# Patient Record
Sex: MALE | Race: BLACK OR AFRICAN AMERICAN | Employment: STUDENT | URBAN - METROPOLITAN AREA
[De-identification: names, ages, dates, MRNs, and addresses within clinical notes are randomized per-mention and may not be internally consistent; named-entity substitution may affect disease eponyms.]

---
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
## 2021-01-05 ENCOUNTER — HOSPITAL ENCOUNTER (OUTPATIENT)
Dept: PHYSICAL THERAPY | Age: 21
Discharge: HOME | End: 2021-01-05
Payer: MEDICAID

## 2021-01-05 PROCEDURE — 97016 VASOPNEUMATIC DEVICE THERAPY: CPT | Performed by: PHYSICAL THERAPIST

## 2021-01-05 PROCEDURE — 97110 THERAPEUTIC EXERCISES: CPT | Performed by: PHYSICAL THERAPIST

## 2021-01-05 NOTE — PROGRESS NOTES
PT DAILY TREATMENT NOTE - [facility] -15    Patient Name: Brielle Gibbs  Date:2021  : 2000  [x]  Patient  Verified  Payor: BLUE CROSS MEDICAID / Plan: [facility] [employer]KEEPERS [facility] / Product Type: Managed Care Medicaid /    In time: 115p  Out time:215p  Total Treatment Time (min): 60  Total Timed Codes (min): 45  1:1 Treatment Time ( [address] only): 39   Visit #:  4    Treatment Area: Right ankle pain [M25.571]    SUBJECTIVE  Pain Level (0-10 scale): 1  Any medication changes, allergies to medications, adverse drug reactions, diagnosis change, or new procedure performed?: [x] No    [] Yes (see summary sheet for update)  Subjective functional status/changes:   [] No changes reported  Ankle is feeling a little better and he is getting around on it more. OBJECTIVE    Modality rationale: decrease edema, decrease inflammation and decrease pain to improve the patients ability to run without pain    Min Type Additional Details        []? Estim: []? Att   []? Unatt    []? TENS instruct                  []?IFC  []? Premod   []? NMES                     []?Other:  []?w/US   []?w/ice   []?w/heat  Position:  Location:        []? Traction: []? Cervical       []? Lumbar                       []? Prone          []? Supine                       []?Intermittent   []? Continuous Lbs:  []? before manual  []? after manual  []?w/heat     []? Ultrasound: []? Continuous   []? Pulsed                       at: []?1MHz   []? 3MHz Location:  W/cm2:     []? Paraffin         Location:   []?w/heat     []? Ice     []? Heat  []? Ice massage Position:  Location:     []? Laser  []? Other: Position:  Location:   15    [x]? Vasopneumatic Device Pressure:       [x]? lo []? med []? hi   Temperature: 34deg       [x]? Skin assessment post-treatment:  [x]? intact []? redness- no adverse reaction    []? redness  adverse reaction:      45 min Therapeutic Exercise:  [x]?  See flow sheet : ankle DF PROM   Rationale: increase ROM, increase strength and improve coordination to improve the patients ability to run without pain     With   []? TE   []? TA   []? neuro   []? other: Patient Education: [x]? Review HEP    []? Progressed/Changed HEP based on:   []? positioning   []? body mechanics   []? transfers   []? heat/ice application    []? other:       Other Objective/Functional Measures: ankle DF: 5deg        Pain Level (0-10 scale) post treatment: 1     ASSESSMENT/Changes in Function:   Added some resisted ankle 4 way today; only reporting some lateral pain with eversion. Did report some medial ankle pain with TG squats that alleviated after [employer]. Patient will continue to benefit from skilled PT services to modify and progress therapeutic interventions, address functional mobility deficits, address ROM deficits, address strength deficits, analyze and address soft tissue restrictions, analyze and cue movement patterns and analyze and modify body mechanics/ergonomics to attain remaining goals. []? See Plan of Care  []? See progress note/recertification  []? See Discharge Summary         Progress towards goals / Updated goals:  Short Term Goals: To be accomplished in 2-4 treatments:               1. Pt will be compliant with initial HEP and PT attendance PROGRESSING               2. Pt will be able to achieve 0deg ankle DF without significant pain  Long Term Goals: To be accomplished in 10-12 treatments:               1. Pt will be able to return to running track with minimal discomfort               2. Patient will be able to ambulate all distances without increase in pain               3. Pt will be able to perform 10 single limb heel raises without increase in pain               4. Pt will be able to self-manage care using updated HEP for improved independence               5. Improved FOTO score to 73 or better to improve function     PLAN  [x]? Upgrade activities as tolerated     [x]? Continue plan of care  []?   Update interventions per flow sheet       []? Discharge due to:_  []?   Other:_        Abena Donohue, PT, DPT 1/5/2021

## 2021-01-08 ENCOUNTER — HOSPITAL ENCOUNTER (OUTPATIENT)
Dept: PHYSICAL THERAPY | Age: 21
Discharge: HOME | End: 2021-01-08
Payer: MEDICAID

## 2021-01-08 PROCEDURE — 97110 THERAPEUTIC EXERCISES: CPT | Performed by: PHYSICAL THERAPIST

## 2021-01-08 PROCEDURE — 97016 VASOPNEUMATIC DEVICE THERAPY: CPT | Performed by: PHYSICAL THERAPIST

## 2021-01-08 NOTE — PROGRESS NOTES
PT DAILY TREATMENT NOTE - [facility] 2-15    Patient Name: Stephane Smith  Date:2021  : 2000  [x]  Patient  Verified  Payor: BLUE CROSS MEDICAID / Plan: [facility] HEALTHKEEPERS PLUS / Product Type: Managed Care Medicaid /    In time: 6955V  Out time: 1205p  Total Treatment Time (min): 70  Total Timed Codes (min): 55  1:1 Treatment Time ([facility] only): 54   Visit #: 5    Treatment Area: Right ankle pain [M25.571]    SUBJECTIVE  Pain Level (0-10 scale): 0  Any medication changes, allergies to medications, adverse drug reactions, diagnosis change, or new procedure performed?: [x] No    [] Yes (see summary sheet for update)  Subjective functional status/changes:   [] No changes reported  Ankle is sore from stretching and doing the exercises, but not painful. OBJECTIVE    Modality rationale: decrease edema, decrease inflammation and decrease pain to improve the patients ability to run without pain     Min Type Additional Details        []? ? Estim: []??Att   []? ? Unatt    []? ?TENS instruct                  []? ?IFC  []? ?Premod   []? ?NMES                     []? ? Other:  []??w/US   []? ?w/ice   []? ?w/heat  Position:  Location:        []? ?  Traction: []?? Cervical       []? ?Lumbar                       []? ? Prone          []? ?Supine                       []? ?Intermittent   []? ? Continuous Lbs:  []?? before manual  []? ? after manual  []? ?w/heat     []? ?  Ultrasound: []??Continuous   []? ? Pulsed                       at: []??1MHz   []? ? 3MHz Location:  W/cm2:     []? ? Paraffin         Location:   []??w/heat     []? ?  Ice     []? ?  Heat  []? ?  Ice massage Position:  Location:     []? ?  Laser  []? ?  Other: Position:  Location:   15    [x]? ?  Vasopneumatic Device Pressure:       [x]? ? lo []? ? med []?? hi   Temperature: 34deg       [x]? ? Skin assessment post-treatment:  [x]? ? intact []? ?redness- no adverse reaction    []? ?redness  adverse reaction:      55 min Therapeutic Exercise:  [x]? ? See flow sheet : ankle DF PROM Rationale: increase ROM, increase strength and improve coordination to improve the patients ability to run without pain     With   []?? TE   []?? TA   []?? neuro   []?? other: Patient Education: [x]? ? Review HEP    []? ? Progressed/Changed HEP based on:   []?? positioning   []? ? body mechanics   []? ? transfers   []? ? heat/ice application    []? ? other:       Other Objective/Functional Measures: --        Pain Level (0-10 scale) post treatment: 1     ASSESSMENT/Changes in Function:   Reduced pain with squats today v. Last session. Demonstrated at least 15deg ankle ROM with CKC DF mob. Still painful with barefoot walking out of brace. Patient will continue to benefit from skilled PT services to modify and progress therapeutic interventions, address functional mobility deficits, address ROM deficits, address strength deficits, analyze and address soft tissue restrictions, analyze and cue movement patterns and analyze and modify body mechanics/ergonomics to attain remaining goals.     []? ?  See Plan of Care  []? ?  See progress note/recertification  []? ?  See Discharge Summary         Progress towards goals / Updated goals:  Short Term Goals: To be accomplished in 2-4 treatments:               1. Pt will be compliant with initial HEP and PT attendance PROGRESSING               2. Pt will be able to achieve 0deg ankle DF without significant pain NEARLY MET  Long Term Goals: To be accomplished in 10-12 treatments:               1. Pt will be able to return to running track with minimal discomfort               2. Patient will be able to ambulate all distances without increase in pain               3. Pt will be able to perform 10 single limb heel raises without increase in pain               4. Pt will be able to self-manage care using updated HEP for improved independence               5. Improved FOTO score to 73 or better to improve function     PLAN  [x]? ?  Upgrade activities as tolerated     [x]? ?  Continue plan of care  []? ?  Update interventions per flow sheet       []? ?  Discharge due to:_  []??  Other:_        Hui Catalan, PT, DPT 1/8/2021

## 2021-01-12 ENCOUNTER — HOSPITAL ENCOUNTER (OUTPATIENT)
Dept: PHYSICAL THERAPY | Age: 21
Discharge: HOME | End: 2021-01-12
Payer: MEDICAID

## 2021-01-12 PROCEDURE — 97110 THERAPEUTIC EXERCISES: CPT

## 2021-01-12 PROCEDURE — 97016 VASOPNEUMATIC DEVICE THERAPY: CPT

## 2021-01-12 NOTE — PROGRESS NOTES
PT DAILY TREATMENT NOTE - [facility] 2-15    Patient Name: Marichuy Zendejas  Date:2021  : 2000  [x]  Patient  Verified  Payor: BLUE CROSS MEDICAID / Plan: [facility] HEALTHKEEPERS PLUS / Product Type: Managed Care Medicaid /    In time:12:30P  Out time:1:35P  Total Treatment Time (min): 65  Total Timed Codes (min): 50  1:1 Treatment Time ([address] only): 50   Visit #:  6    Treatment Area: Right ankle pain [M25.571]    SUBJECTIVE  Pain Level (0-10 scale): 0/10  Any medication changes, allergies to medications, adverse drug reactions, diagnosis change, or new procedure performed?: [x]? No    []? Yes (see summary sheet for update)  Subjective functional status/changes:   []? No changes reported  Pt reported continued soreness along anterior aspect of ankle.      OBJECTIVE            Modality rationale: decrease edema, decrease inflammation and decrease pain to improve the patients ability to run without pain     Min Type Additional Details        []??? Estim: []? ? ? Att   []? ??Unatt    []? ??TENS instruct                  []? ??IFC  []? ? ?Premod   []? ??NMES                     []? ??Other:  []???w/US   []? ??w/ice   []? ??w/heat  Position:  Location:        []? ??  Traction: []??? Cervical       []? ? ?Lumbar                       []? ?? Prone          []? ? ?Supine                       []? ? ? Intermittent   []? ?? Continuous Lbs:  []??? before manual  []? ?? after manual  []? ??w/heat     []? ??  Ultrasound: []? ? ? Continuous   []? ?? Pulsed                       at: []???1MHz   []? ??3MHz Location:  W/cm2:     []??? Paraffin         Location:   []???w/heat     []? ??  Ice     []? ??  Heat  []? ??  Ice massage Position:  Location:     []? ??  Laser  []? ??  Other: Position:  Location:   15    [x]? ??  Vasopneumatic Device Pressure:       [x]? ?? lo []? ?? med []? ?? hi   Temperature: 34deg       [x]? ?? Skin assessment post-treatment:  [x]? ??intact []? ??redness- no adverse reaction    []? ??redness  adverse reaction:      50 min Therapeutic Exercise:  [x]? ?? See flow sheet : ankle DF PROM   Rationale: increase ROM, increase strength and improve coordination to improve the patients ability to run without pain     With   []??? TE   []??? TA   []??? neuro   []? ?? other: Patient Education: [x]??? Review HEP    []? ?? Progressed/Changed HEP based on:   []??? positioning   []? ?? body mechanics   []? ?? transfers   []? ?? heat/ice application    []? ?? other:       Other Objective/Functional Measures: --        Pain Level (0-10 scale) post treatment: 1/10     ASSESSMENT/Changes in Function:   Pt continues to be limited in exercise advancement secondary to pain in anterior ankle. Pt advised to reach out to MD's office for follow  Up regarding current status. Patient will continue to benefit from skilled PT services to modify and progress therapeutic interventions, address functional mobility deficits, address ROM deficits, address strength deficits, analyze and address soft tissue restrictions, analyze and cue movement patterns and analyze and modify body mechanics/ergonomics to attain remaining goals.     []? ??  See Plan of Care  []? ??  See progress note/recertification  []? ??  See Discharge Summary         Progress towards goals / Updated goals:  Short Term Goals: To be accomplished in 2-4 treatments:               1. Pt will be compliant with initial HEP and PT attendance PROGRESSING               2. Pt will be able to achieve 0deg ankle DF without significant pain NEARLY MET  Long Term Goals: To be accomplished in 10-12 treatments:               1. Pt will be able to return to running track with minimal discomfort               2. Patient will be able to ambulate all distances without increase in pain               3. Pt will be able to perform 10 single limb heel raises without increase in pain               4. Pt will be able to self-manage care using updated HEP for improved independence               5.  Improved FOTO score to 73 or better to improve function     PLAN  [x]???  Upgrade activities as tolerated     [x]? ??  Continue plan of care  []? ??  Update interventions per flow sheet       []???  Discharge due to:_  []???  Other:_         Rebeca Cristina PTA, OPTA 1/12/2021

## 2021-01-15 ENCOUNTER — HOSPITAL ENCOUNTER (OUTPATIENT)
Dept: PHYSICAL THERAPY | Age: 21
Discharge: HOME | End: 2021-01-15
Payer: MEDICAID

## 2021-01-15 PROCEDURE — 97110 THERAPEUTIC EXERCISES: CPT | Performed by: PHYSICAL THERAPIST

## 2021-01-15 PROCEDURE — 97140 MANUAL THERAPY 1/> REGIONS: CPT | Performed by: PHYSICAL THERAPIST

## 2021-01-15 PROCEDURE — 97016 VASOPNEUMATIC DEVICE THERAPY: CPT | Performed by: PHYSICAL THERAPIST

## 2021-01-15 NOTE — PROGRESS NOTES
PT DAILY TREATMENT NOTE - [facility] 2-15    Patient Name: Liam Fritz  Date:1/15/2021  : 2000  [x]  Patient  Verified  Payor: BLUE CROSS MEDICAID / Plan: [facility] HEALTHKEEPERS PLUS / Product Type: Managed Care Medicaid /    In time:  0495E  Out time:105p  Total Treatment Time (min): 72  Total Timed Codes (min): 57  1:1 Treatment Time ( only): 54   Visit #: 7    Treatment Area: Right ankle pain [M25.571]    SUBJECTIVE  Pain Level (0-10 scale): 0  Any medication changes, allergies to medications, adverse drug reactions, diagnosis change, or new procedure performed?: [x] No    [] Yes (see summary sheet for update)  Subjective functional status/changes:   [] No changes reported  Doing 'great'. Saw the MD this morning. X-rays look good, just want to get an MRI on Monday to make sure everything is healing right. OBJECTIVE    Modality rationale: decrease edema, decrease inflammation and decrease pain to improve the patients ability to run without pain     Min Type Additional Details        []???? Estim: []?? ?? Att   []????Unatt    []????TENS instruct                  []????IFC  []????Premod   []????NMES                     []?? ??Other:  []????w/US   []? ???w/ice   []? ???w/heat  Position:  Location:        []????  Traction: []???? Cervical       []????Lumbar                       []???? Prone          []????Supine                       []?? ?? Intermittent   []???? Continuous Lbs:  []???? before manual  []???? after manual  []? ???w/heat     []????  Ultrasound: []???? Continuous   []???? Pulsed                       at: []????1MHz   []????3MHz Location:  W/cm2:     []???? Paraffin         Location:   []????w/heat     []????  Ice     []????  Heat  []????  Ice massage Position:  Location:     []????  Laser  []????  Other: Position:  Location:   15    [x]? ???  Vasopneumatic Device Pressure:       [x]? ??? lo []???? med []???? hi   Temperature: 34deg       [x]? ??? Skin assessment post-treatment:  [x]? ???intact []????redness- no adverse reaction    []? ???redness  adverse reaction:      47 min Therapeutic Exercise:  [x]? ??? See flow sheet :   Rationale: increase ROM, increase strength and improve coordination to improve the patients ability to run without pain     10 min Manual Therapy: Talocrural GI-II posterior mobs (for DF). Talocrural distraction    Rationale: decrease pain, increase ROM and increase tissue extensibility to improve the patients ability to run without pain    With   []???? TE   []???? TA   []???? neuro   []???? other: Patient Education: [x]???? Review HEP    []???? Progressed/Changed HEP based on:   []???? positioning   []???? body mechanics   []???? transfers   []???? heat/ice application    []???? other:       Other Objective/Functional Measures: --        Pain Level (0-10 scale) post treatment: 1/10     ASSESSMENT/Changes in Function:   Articulating his pain located in anterior ankle, over ant. Tibialis and talocrural joint. Painful and guarded with mobs today, but emphasized having him perform calf stretching and DF mobs with tolerable pain. Will progress pending MRI results (scheduled Monday)  Patient will continue to benefit from skilled PT services to modify and progress therapeutic interventions, address functional mobility deficits, address ROM deficits, address strength deficits, analyze and address soft tissue restrictions, analyze and cue movement patterns and analyze and modify body mechanics/ergonomics to attain remaining goals.     []????  See Plan of Care  []????  See progress note/recertification  []????  See Discharge Summary         Progress towards goals / Updated goals:  Short Term Goals: To be accomplished in 2-4 treatments:               1.  Pt will be compliant with initial HEP and PT attendance PROGRESSING               2. Pt will be able to achieve 0deg ankle DF without significant pain NEARLY MET  Long Term Goals: To be accomplished in 10-12 treatments:               1. Pt will be able to return to running track with minimal discomfort               2. Patient will be able to ambulate all distances without increase in pain               3. Pt will be able to perform 10 single limb heel raises without increase in pain               4. Pt will be able to self-manage care using updated HEP for improved independence               5. Improved FOTO score to 73 or better to improve function     PLAN  [x]????  Upgrade activities as tolerated     [x]? ???  Continue plan of care  []????  Update interventions per flow sheet       []????  Discharge due to:_  []????  Other:_           Opal Patient, PT, DPT 1/15/2021

## 2021-01-19 ENCOUNTER — HOSPITAL ENCOUNTER (OUTPATIENT)
Dept: PHYSICAL THERAPY | Age: 21
Discharge: HOME | End: 2021-01-19
Payer: MEDICAID

## 2021-01-19 PROCEDURE — 97110 THERAPEUTIC EXERCISES: CPT | Performed by: PHYSICAL THERAPIST

## 2021-01-19 PROCEDURE — 97140 MANUAL THERAPY 1/> REGIONS: CPT | Performed by: PHYSICAL THERAPIST

## 2021-01-19 PROCEDURE — 97016 VASOPNEUMATIC DEVICE THERAPY: CPT | Performed by: PHYSICAL THERAPIST

## 2021-01-19 NOTE — PROGRESS NOTES
PT DAILY TREATMENT NOTE - [facility] 2-15    Patient Name: Royal Mcintosh  Date:2021  : 2000  [x]  Patient  Verified  Payor: BLUE CROSS MEDICAID / Plan: [facility] HEALTHKEEPERS PLUS / Product Type: Managed Care Medicaid /    In time: 800a  Out time:908a  Total Treatment Time (min): 68  Total Timed Codes (min): 58  1:1 Treatment Time ([address] only): 62   Visit #:  8    Treatment Area: Right ankle pain [M25.571]    SUBJECTIVE  Pain Level (0-10 scale): 0   Any medication changes, allergies to medications, adverse drug reactions, diagnosis change, or new procedure performed?: [x] No    [] Yes (see summary sheet for update)  Subjective functional status/changes:   [] No changes reported  Feeling good today. No ankle pain over the weekend. Got back too late yesterday and missed his MRI appointment. He is going to go in again on 21. OBJECTIVE    Modality rationale: decrease edema, decrease inflammation and decrease pain to improve the patients ability to run without pain     Min Type Additional Details        []????? Estim: []??? ? ? Att   []??? ?? Unatt    []?????TENS instruct                  []?????IFC  []??? ? ? Premod   []??? ??NMES                     []??? ?? Other:  []?????w/US   []?????w/ice   []?????w/heat  Position:  Location:        []?????  Traction: []????? Cervical       []??? ? ?Lumbar                       []????? Prone          []??? ? ? Supine                       []??? ? ? Intermittent   []??? ? ? Continuous Lbs:  []????? before manual  []????? after manual  []?????w/heat     []?????  Ultrasound: []??? ? ? Continuous   []????? Pulsed                       at: []?????1MHz   []?????3MHz Location:  W/cm2:     []????? Paraffin         Location:   []?????w/heat     []?????  Ice     []?????  Heat  []?????  Ice massage Position:  Location:     []?????  Laser  []?????  Other: Position:  Location:   15    [x]?????  Vasopneumatic Device Pressure:       [x]????? lo []????? med []????? hi   Temperature: 34deg     [x]????? Skin assessment post-treatment:  [x]? ????intact []?????redness- no adverse reaction    []?????redness  adverse reaction:      43 min Therapeutic Exercise:  [x]? ???? See flow sheet :   Rationale: increase ROM, increase strength and improve coordination to improve the patients ability to run without pain     10 min Manual Therapy: Talocrural GI-II posterior mobs (for DF). Talocrural distraction, 1/2 kneel DF mobilizations    Rationale: decrease pain, increase ROM and increase tissue extensibility to improve the patients ability to run without pain     With   []????? TE   []????? TA   []????? neuro   []????? other: Patient Education: [x]????? Review HEP    []????? Progressed/Changed HEP based on:   []????? positioning   []????? body mechanics   []????? transfers   []????? heat/ice application    []????? other:       Other Objective/Functional Measures: [facility] DF: 20deg        Pain Level (0-10 scale) post treatment: 1/10     ASSESSMENT/Changes in Function:   Dramatic improvement in activity tolerance today without any pain noted with all manual techniques or exercises. I do have some concern he is neglecting his pain in order to get cleared to return to running, but I did not see any wincing/limping or other subconscious signs of pain. Will continue to progress per tolerance, and assess for any swelling or pain after additional exercises today.    Patient will continue to benefit from skilled PT services to modify and progress therapeutic interventions, address functional mobility deficits, address ROM deficits, address strength deficits, analyze and address soft tissue restrictions, analyze and cue movement patterns and analyze and modify body mechanics/ergonomics to attain remaining goals.     []?????  See Plan of Care  []?????  See progress note/recertification  []?????  See Discharge Summary         Progress towards goals / Updated goals:  Short Term Goals: To be accomplished in 2-4 treatments:               1. Pt will be compliant with initial HEP and PT attendance MET               2. Pt will be able to achieve 0deg ankle DF without significant pain MET  Long Term Goals: To be accomplished in 10-12 treatments:               1. Pt will be able to return to running track with minimal discomfort               2. Patient will be able to ambulate all distances without increase in pain VERY NEARLY MET               3. Pt will be able to perform 10 single limb heel raises without increase in pain PROGRESSING               4. Pt will be able to self-manage care using updated HEP for improved independence               5.  Improved FOTO score to 73 or better to improve function     PLAN  [x]?????  Upgrade activities as tolerated     [x]?????  Continue plan of care  []?????  Update interventions per flow sheet       []?????  Discharge due to:_  []?????  Other:_      Martha Alonzo PT, DPT 1/19/2021

## 2021-01-21 ENCOUNTER — HOSPITAL ENCOUNTER (OUTPATIENT)
Dept: PHYSICAL THERAPY | Age: 21
Discharge: HOME | End: 2021-01-21
Payer: MEDICAID

## 2021-01-21 PROCEDURE — 97016 VASOPNEUMATIC DEVICE THERAPY: CPT | Performed by: PHYSICAL THERAPIST

## 2021-01-21 PROCEDURE — 97110 THERAPEUTIC EXERCISES: CPT | Performed by: PHYSICAL THERAPIST

## 2021-01-21 PROCEDURE — 97140 MANUAL THERAPY 1/> REGIONS: CPT | Performed by: PHYSICAL THERAPIST

## 2021-01-21 NOTE — PROGRESS NOTES
PT DAILY TREATMENT NOTE - [facility] 2-15    Patient Name: Royal Mcintosh  Date:2021  : 2000  [x]  Patient  Verified  Payor: BLUE CROSS MEDICAID / Plan: [facility] HEALTHKEEPERS PLUS / Product Type: Managed Care Medicaid /    In time: 181S  Out time: 815a  Total Treatment Time (min): 62  Total Timed Codes (min): 47  1:1 Treatment Time ([address] only): 38   Visit #: 9    Treatment Area: Right ankle pain [M25.571]     SUBJECTIVE  Pain Level (0-10 scale): 0  Any medication changes, allergies to medications, adverse drug reactions, diagnosis change, or new procedure performed?: [x] No    [] Yes (see summary sheet for update)  Subjective functional status/changes:   [] No changes reported  Doing well since last visit. He denies any pain, soreness, or swelling with new exercises. OBJECTIVE    Modality rationale: decrease edema, decrease inflammation and decrease pain to improve the patients ability to run without pain     Min Type Additional Details        []?????? Estim: []???? ? ? Att   []???? ?? Unatt    []??????TENS instruct                  []??????IFC  []?????? Premod   []??????NMES                     []?????? Other:  []??????w/US   []??????w/ice   []??????w/heat  Position:  Location:        []??????  Traction: []?????? Cervical       []??????Lumbar                       []?????? Prone          []?????? Supine                       []???? ? ? Intermittent   []?????? Continuous Lbs:  []?????? before manual  []?????? after manual  []??????w/heat     []??????  Ultrasound: []?????? Continuous   []?????? Pulsed                       at: []??????1MHz   []??????3MHz Location:  W/cm2:     []?????? Paraffin         Location:   []??????w/heat     []??????  Ice     []??????  Heat  []??????  Ice massage Position:  Location:     []??????  Laser  []??????  Other: Position:  Location:   15    [x]??????  Vasopneumatic Device Pressure:       [x]?????? lo []?????? med []?????? hi   Temperature: 34deg       [x]?????? Skin assessment post-treatment:  [x]??????intact []??????redness- no adverse reaction    []??????redness  adverse reaction:      37 min Therapeutic Exercise:  [x]?????? See flow sheet :   Rationale: increase ROM, increase strength and improve coordination to improve the patients ability to run without pain     10 min Manual Therapy: Talocrural GI-II posterior mobs (for DF). Talocrural distraction, 1/2 kneel DF mobilizations    Rationale: decrease pain, increase ROM and increase tissue extensibility to improve the patients ability to run without pain     With   []?????? TE   []?????? TA   []?????? neuro   []?????? other: Patient Education: [x]?????? Review HEP    []?????? Progressed/Changed HEP based on:   []?????? positioning   []?????? body mechanics   []?????? transfers   []?????? heat/ice application    []?????? other:       Other Objective/Functional Measures: [facility] DF L ankle: 20deg     Pain Level (0-10 scale) post treatment: 0/10     ASSESSMENT/Changes in Function:   Seems to have about equal, albeit somewhat limited, DF mobility bilaterally at this time. Still not reporting any pain with all activities, even higher level exercises such as sled push and jumping exercises. Did not have him work on landing, instead kept him to landing only on L foot. Patient will continue to benefit from skilled PT services to modify and progress therapeutic interventions, address functional mobility deficits, address ROM deficits, address strength deficits, analyze and address soft tissue restrictions, analyze and cue movement patterns and analyze and modify body mechanics/ergonomics to attain remaining goals.     []??????  See Plan of Care  []??????  See progress note/recertification  []??????  See Discharge Summary         Progress towards goals / Updated goals:  Short Term Goals: To be accomplished in 2-4 treatments:               1.  Pt will be compliant with initial HEP and PT attendance MET               2. Pt will be able to achieve 0deg ankle DF without significant pain MET  Long Term Goals: To be accomplished in 10-12 treatments:               1. Pt will be able to return to running track with minimal discomfort PROGRESSING               2. Patient will be able to ambulate all distances without increase in pain MET               3. Pt will be able to perform 10 single limb heel raises without increase in pain PROGRESSING               4. Pt will be able to self-manage care using updated HEP for improved independence               5.  Improved FOTO score to 73 or better to improve function     PLAN  [x]??????  Upgrade activities as tolerated     [x]??????  Continue plan of care  []??????  Update interventions per flow sheet       []??????  Discharge due to:_  []??????  Other:_    Shira Donahue, PT, DPT 1/21/2021

## 2021-01-28 ENCOUNTER — HOSPITAL ENCOUNTER (OUTPATIENT)
Dept: PHYSICAL THERAPY | Age: 21
Discharge: HOME | End: 2021-01-28
Payer: MEDICAID

## 2021-01-28 PROCEDURE — 97140 MANUAL THERAPY 1/> REGIONS: CPT | Performed by: PHYSICAL THERAPIST

## 2021-01-28 PROCEDURE — 97016 VASOPNEUMATIC DEVICE THERAPY: CPT | Performed by: PHYSICAL THERAPIST

## 2021-01-28 PROCEDURE — 97110 THERAPEUTIC EXERCISES: CPT | Performed by: PHYSICAL THERAPIST

## 2021-01-28 PROCEDURE — 97530 THERAPEUTIC ACTIVITIES: CPT | Performed by: PHYSICAL THERAPIST

## 2021-01-28 NOTE — PROGRESS NOTES
PT DAILY TREATMENT NOTE - [facility] -15    Patient Name: Jose Miguel Aguero  Date:2021  : 2000  [x]  Patient  Verified  Payor: BLUE CROSS MEDICAID / Plan: [facility] HEALTHKEEPERS PLUS / Product Type: Managed Care Medicaid /    In time: 0a  Out time: 812a  Total Treatment Time (min): 70  Total Timed Codes (min): 55  1:1 Treatment Time ([address] only): 48   Visit #: 10    Treatment Area: Right ankle pain [M25.571]    SUBJECTIVE  Pain Level (0-10 scale): 0  Any medication changes, allergies to medications, adverse drug reactions, diagnosis change, or new procedure performed?: [x] No    [] Yes (see summary sheet for update)  Subjective functional status/changes:   [] No changes reported  Got his MRI yesterday so is waiting for the results. He says the ankle has felt fine; no issues. OBJECTIVE    Modality rationale: decrease edema, decrease inflammation and decrease pain to improve the patients ability to run without pain     Min Type Additional Details        []??????? Estim: []????? ?? Att   []??????? Unatt    []???????TENS instruct                  []???????IFC  []??????? Premod   []???????NMES                     []??????? Other:  []???????w/US   []???????w/ice   []???????w/heat  Position:  Location:        []???????  Traction: []??????? Cervical       []???????Lumbar                       []??????? Prone          []??????? Supine                       []????? ? ? Intermittent   []??????? Continuous Lbs:  []??????? before manual  []??????? after manual  []???????w/heat     []???????  Ultrasound: []??????? Continuous   []??????? Pulsed                       at: []???????1MHz   []???????3MHz Location:  W/cm2:     []??????? Paraffin         Location:   []???????w/heat     []???????  Ice     []???????  Heat  []???????  Ice massage Position:  Location:     []???????  Laser  []???????  Other: Position:  Location:   15    [x]???????  Vasopneumatic Device Pressure:       [x]??????? lo []??????? med []??????? hi Temperature: 34deg       [x]??????? Skin assessment post-treatment:  [x]???????intact []???????redness- no adverse reaction    []???????redness  adverse reaction:      35 min Therapeutic Exercise:  [x]??????? See flow sheet :   Rationale: increase ROM, increase strength and improve coordination to improve the patients ability to run without pain     10 min Therapeutic Activity:  [x]  See flow sheet : box jump training   Rationale: increase strength and improve coordination  to improve the patients ability to run without pain    8 min Manual Therapy: Talocrural GI-II posterior mobs (for DF). Talocrural distraction, 1/2 kneel DF mobilizations    Rationale: decrease pain, increase ROM and increase tissue extensibility to improve the patients ability to run without pain     With   []??????? TE   []??????? TA   []??????? neuro   []??????? other: Patient Education: [x]??????? Review HEP    []??????? Progressed/Changed HEP based on:   []??????? positioning   []??????? body mechanics   []??????? transfers   []??????? heat/ice application    []??????? other:       Other Objective/Functional Measures: --     Pain Level (0-10 scale) post treatment: 0/10     ASSESSMENT/Changes in Function:   Did box jump ups and downs today, which was fatiguing to ankle, but no reports of pain. Patient reports no pain with all activities, including jumping.   Patient will continue to benefit from skilled PT services to modify and progress therapeutic interventions, address functional mobility deficits, address ROM deficits, address strength deficits, analyze and address soft tissue restrictions, analyze and cue movement patterns and analyze and modify body mechanics/ergonomics to attain remaining goals.     []???????  See Plan of Care  []???????  See progress note/recertification  []???????  See Discharge Summary         Progress towards goals / Updated goals:  Short Term Goals: To be accomplished in 2-4 treatments:               1. Pt will be compliant with initial HEP and PT attendance MET               2. Pt will be able to achieve 0deg ankle DF without significant pain MET  Long Term Goals: To be accomplished in 10-12 treatments:               1. Pt will be able to return to running track with minimal discomfort PROGRESSING               2. Patient will be able to ambulate all distances without increase in pain MET               3. Pt will be able to perform 10 single limb heel raises without increase in pain MET               4. Pt will be able to self-manage care using updated HEP for improved independence PROGRESSING               5.  Improved FOTO score to 73 or better to improve function     PLAN  [x]???????  Upgrade activities as tolerated     [x]???????  Continue plan of care  []???????  Update interventions per flow sheet       []???????  Discharge due to:_  []???????  Other:_      Bernarda Tesfaye, PT, DPT 1/28/2021

## 2021-01-28 NOTE — PROGRESS NOTES
Physical Therapy at [facility],   a part of [place] [id number], [address] [place] [address], [address] [place] [address]  Phone: [phone] Fax: [phone]    Progress Note    Name: Brielle Gibbs   : 2000   MD: Nathan Greco MD       Treatment Diagnosis: Right ankle pain [M25.571]  Start of Care:  2020    Visits from Start of Care: 10  Missed Visits: -    Summary of Care: Kelby Choi has made excellent progress over the last few weeks with his ankle rehab. He now has equal dorsiflexion ROM bilaterally, and is reporting no pain with heavy activity including light plyometrics and end range ankle strengthening. We have hesitated to bring him back to full plyo/running/cutting exercises while awaiting results of MRI, but provided his subjective pain rating is accurate, he should be able to return to running pending MRI results. Short Term Goals: To be accomplished in 2-4 treatments:               1. Pt will be compliant with initial HEP and PT attendance MET               2. Pt will be able to achieve 0deg ankle DF without significant pain MET  Long Term Goals: To be accomplished in 10-12 treatments:               1. Pt will be able to return to running track with minimal discomfort PROGRESSING               2. Patient will be able to ambulate all distances without increase in pain MET               3. Pt will be able to perform 10 single limb heel raises without increase in pain MET               4. Pt will be able to self-manage care using updated HEP for improved independence PROGRESSING               5. Improved FOTO score to 73 or better to improve function    Assessment / Recommendations: Other: Will recommend patient be allowed to return to running pending MRI results.  Plan to progress to increased running/jumping exercises and full clearance pending MD approval.     Hui Catalan, PT, DPT 2021 ________________________________________________________________________  NOTE TO PHYSICIAN:  Please complete the following and fax to: [facility]: Fax: [phone] . Kenzie Damian Retain this original for your records. If you are unable to process this request in 24 hours, please contact our office.        ____ I have read the above report and request that my patient continue therapy with the following changes/special instructions:  ____ I have read the above report and request that my patient be discharged from therapy    Physician's Signature:_________________ Date:___________Time:__________

## 2021-02-02 ENCOUNTER — HOSPITAL ENCOUNTER (OUTPATIENT)
Dept: PHYSICAL THERAPY | Age: 21
Discharge: HOME | End: 2021-02-02
Payer: MEDICAID

## 2021-02-02 PROCEDURE — 97530 THERAPEUTIC ACTIVITIES: CPT | Performed by: PHYSICAL THERAPIST

## 2021-02-02 PROCEDURE — 97016 VASOPNEUMATIC DEVICE THERAPY: CPT | Performed by: PHYSICAL THERAPIST

## 2021-02-02 PROCEDURE — 97110 THERAPEUTIC EXERCISES: CPT | Performed by: PHYSICAL THERAPIST

## 2021-02-02 NOTE — PROGRESS NOTES
PT DAILY TREATMENT NOTE - [facility] 2-15    Patient Name: Lyn Kauffman  Date:2021  : 2000  [x]  Patient  Verified  Payor: BLUE CROSS MEDICAID / Plan: [facility] [employer] PLUS / Product Type: Managed Care Medicaid /    In time: 682K  Out time: 850a  Total Treatment Time (min): 46  Total Timed Codes (min): 31  1:1 Treatment Time ( only): 31   Visit #:  11    Treatment Area: Right ankle pain [M25.571]    SUBJECTIVE  Pain Level (0-10 scale): 0  Any medication changes, allergies to medications, adverse drug reactions, diagnosis change, or new procedure performed?: [x] No    [] Yes (see summary sheet for update)  Subjective functional status/changes:   [] No changes reported  Ankle is hurting him when he runs about 1/2 a lap. Otherwise is doing pretty well. OBJECTIVE    Modality rationale: decrease edema, decrease inflammation and decrease pain to improve the patients ability to run without pain     Min Type Additional Details        []???????? Estim: []?????? ?? Att   []???????? Unatt    []????????TENS instruct                  []????????IFC  []???????? Premod   []????????NMES                     []???????? Other:  []????????w/US   []????????w/ice   []????????w/heat  Position:  Location:        []????????  Traction: []???????? Cervical       []????????Lumbar                       []???????? Prone          []???????? Supine                       []?????? ? ? Intermittent   []???????? Continuous Lbs:  []???????? before manual  []???????? after manual  []????????w/heat     []????????  Ultrasound: []???????? Continuous   []???????? Pulsed                       at: []????????1MHz   []????????3MHz Location:  W/cm2:     []???????? Paraffin         Location:   []????????w/heat     []????????  Ice     []????????  Heat  []????????  Ice massage Position:  Location:     []????????  Laser  []????????  Other: Position:  Location:   15    [x]????????  Vasopneumatic Device Pressure:       [x]???????? lo []???????? med []???????? hi   Temperature: 34deg       [x]???????? Skin assessment post-treatment:  [x]????????intact []????????redness- no adverse reaction    []????????redness  adverse reaction:      21 min Therapeutic Exercise:  [x]???????? See flow sheet :   Rationale: increase ROM, increase strength and improve coordination to improve the patients ability to run without pain     10 min Therapeutic Activity:  [x]? See flow sheet : box jump training   Rationale: increase strength and improve coordination  to improve the patients ability to run without pain     With   []???????? TE   []???????? TA   []???????? neuro   []???????? other: Patient Education: [x]???????? Review HEP    []???????? Progressed/Changed HEP based on:   []???????? positioning   []???????? body mechanics   []???????? transfers   []???????? heat/ice application    []???????? other:       Other Objective/Functional Measures: --     Pain Level (0-10 scale) post treatment: 0/10     ASSESSMENT/Changes in Function:   Pain with single limb jumps on TG today. Cautioned against beginning sprinting before pain-free jogging. Discussed that increased pain with running is somewhat expected, and recommended he proceed gently into it. Patient will continue to benefit from skilled PT services to modify and progress therapeutic interventions, address functional mobility deficits, address ROM deficits, address strength deficits, analyze and address soft tissue restrictions, analyze and cue movement patterns and analyze and modify body mechanics/ergonomics to attain remaining goals.     []????????  See Plan of Care  []????????  See progress note/recertification  []????????  See Discharge Summary         Progress towards goals / Updated goals:  Short Term Goals: To be accomplished in 2-4 treatments:               1.  Pt will be compliant with initial HEP and PT attendance MET               2. Pt will be able to achieve 0deg ankle DF without significant pain MET  Long Term Goals: To be accomplished in 10-12 treatments:               1. Pt will be able to return to running track with minimal discomfort PROGRESSING               2. Patient will be able to ambulate all distances without increase in pain MET               3. Pt will be able to perform 10 single limb heel raises without increase in pain MET               4. Pt will be able to self-manage care using updated HEP for improved independence PROGRESSING               5.  Improved FOTO score to 73 or better to improve function     PLAN  [x]????????  Upgrade activities as tolerated     [x]????????  Continue plan of care  []????????  Update interventions per flow sheet       []????????  Discharge due to:_  []????????  Other:_    Lacy Wilhelm, PT, DPT 2/2/2021

## 2021-03-08 ENCOUNTER — TRANSCRIBE ORDER (OUTPATIENT)
Dept: EMERGENCY DEPT | Age: 21
End: 2021-03-08

## 2021-03-08 ENCOUNTER — HOSPITAL ENCOUNTER (OUTPATIENT)
Dept: GENERAL RADIOLOGY | Age: 21
Discharge: HOME | End: 2021-03-08
Payer: MEDICAID

## 2021-03-08 DIAGNOSIS — R10.11 ABDOMINAL PAIN, RIGHT UPPER QUADRANT: Primary | ICD-10-CM

## 2021-03-08 DIAGNOSIS — R10.11 ABDOMINAL PAIN, RIGHT UPPER QUADRANT: ICD-10-CM

## 2021-03-08 PROCEDURE — 74019 RADEX ABDOMEN 2 VIEWS: CPT

## 2021-04-01 NOTE — ANCILLARY DISCHARGE INSTRUCTIONS
Physical Therapy at [facility],  
a part of [address] [place] [address], [address] Phone: [phone]  Fax: [phone] Discharge Summary  2-15 Patient name: Stephanie Ricks  : 2000  Provider#: [id number] Referral source: Annabella Gregory MD     
Medical/Treatment Diagnosis: Right ankle pain [M25.571] Prior Hospitalization: see medical history Comorbidities: none reported Prior Level of Function: able to run track without pain Medications: Verified on Patient Summary List 
  
Start of Care: 2020                                                                            Onset Date: 2020 Visits from Start of Care: 11     Missed Visits: -- 
Reporting Period : 21 to 21 ASSESSMENT/SUMMARY OF CARE: Augie was seen for 11 sessions regarding his R distal tibial fracture. He made good progress over the course of care, but was still having some pain with sprinting. Cautioned him about getting back into running too quickly and to build back into running. He has not returned since his 21 session, and will be discharged at this time. Short Term Goals: To be accomplished in 2-4 treatments: 
             1. Pt will be compliant with initial HEP and PT attendance MET 
             2. Pt will be able to achieve 0deg ankle DF without significant pain MET Long Term Goals: To be accomplished in 10-12 treatments: 
             1. Pt will be able to return to running track with minimal discomfort PROGRESSING 
             2. Patient will be able to ambulate all distances without increase in pain MET 
             3. Pt will be able to perform 10 single limb heel raises without increase in pain MET 
             4. Pt will be able to self-manage care using updated HEP for improved independence PROGRESSING 
             5. Improved FOTO score to 73 or better to improve function RECOMMENDATIONS: 
[x]Discontinue therapy: [x]Patient has reached or is progressing toward set goals [x]Patient has abdicated 
    []Due to lack of appreciable progress towards set goals Abdiel Arceo, PT, DPT 4/1/2021

## 2021-08-25 NOTE — ED NOTES
I have reviewed discharge instructions with the patient. The patient verbalized understanding. The Patient and Family member wereeducated on frequent/proper hand-washing techniques, Standard precautions, avoid crowds and persons with known infections and staying current with immunizations. The Patient and Family member were given time and space to ask questions.
Improved

## 2021-11-15 ENCOUNTER — OFFICE VISIT (OUTPATIENT)
Dept: ORTHOPEDIC SURGERY | Age: 21
End: 2021-11-15
Payer: MEDICAID

## 2021-11-15 VITALS — BODY MASS INDEX: 23.3 KG/M2 | HEIGHT: 66 IN | WEIGHT: 145 LBS

## 2021-11-15 DIAGNOSIS — M25.571 CHRONIC PAIN OF RIGHT ANKLE: Primary | ICD-10-CM

## 2021-11-15 DIAGNOSIS — G89.29 CHRONIC PAIN OF RIGHT ANKLE: Primary | ICD-10-CM

## 2021-11-15 DIAGNOSIS — M25.371 RIGHT ANKLE INSTABILITY: ICD-10-CM

## 2021-11-15 PROCEDURE — 99214 OFFICE O/P EST MOD 30 MIN: CPT | Performed by: ORTHOPAEDIC SURGERY

## 2021-11-15 PROCEDURE — L1902 AFO ANKLE GAUNTLET PRE OTS: HCPCS | Performed by: ORTHOPAEDIC SURGERY

## 2021-11-15 NOTE — PROGRESS NOTES
Elena Cho (: 2000) is a 22 y.o. male patient, here for evaluation of the following chief complaint(s): Ankle Pain (right)       ASSESSMENT/PLAN:  Below is the assessment and plan developed based on review of pertinent history, physical exam, labs, studies, and medications. Chronic right ankle pain extract instability discomfort history of fracture MRI back in January showed anterior talofibular ligament chronic instability we discussed the scope of [place]o is failed conservative measures including physical therapy      1. Chronic pain of right ankle  -     XR ANKLE RT MIN 3 V; Future      No follow-ups on file. SUBJECTIVE/OBJECTIVE:  Elena Cho (: 2000) is a 22 y.o. male who presents today for the following:  Chief Complaint   Patient presents with    Ankle Pain     right       Continues to have ankle pain despite PT training shoe modifications has a feeling that his ankle will hold him up wants to give way    IMAGING:  AP lateral mortise view right ankle no fracture no loose body no severe lesion congruent mortise plates are closed    Allergies   Allergen Reactions    Nectarine Itching       Current Outpatient Medications   Medication Sig    ibuprofen (MOTRIN) 600 mg tablet Take 1 Tab by mouth every six (6) hours as needed for Pain. (Patient not taking: Reported on 11/15/2021)     No current facility-administered medications for this visit. History reviewed. No pertinent past medical history. History reviewed. No pertinent surgical history. History reviewed. No pertinent family history.      Social History     Tobacco Use    Smoking status: Never Smoker    Smokeless tobacco: Never Used   Substance Use Topics    Alcohol use: No        Review of Systems  ROS     Positive for: Musculoskeletal (right ankle )    Negative for: Constitutional, Gastrointestinal, Neurological, Skin, Genitourinary, HENT, Endocrine, Cardiovascular, Eyes, Respiratory, Psychiatric, Allergic/Imm, Heme/Lymph    Last edited by Axel Celeste RN on 11/15/2021  1:39 PM. (History)         No flowsheet data found. Vitals:  Ht 5' 6\" (1.676 m)   Wt 145 lb (65.8 kg)   BMI 23.40 kg/m²    Body mass index is 23.4 kg/m². Physical Exam    Pleasant young man Chris thin well-groomed has got a significant anterior drawer both in neutral and slight plantarflexion he has good subtalar motion Achilles is intact he has no pain over the peroneal sheath proximal tib-fib is nontender syndesmosis is nontender all his pain is over the anterior talofibular ligament      An electronic signature was used to authenticate this note.   -- Mariana Appiah MD

## 2021-11-23 ENCOUNTER — TRANSCRIBE ORDER (OUTPATIENT)
Dept: REGISTRATION | Age: 21
End: 2021-11-23

## 2021-11-23 DIAGNOSIS — Z01.812 PRE-PROCEDURE LAB EXAM: Primary | ICD-10-CM

## 2021-11-30 ENCOUNTER — HOSPITAL ENCOUNTER (OUTPATIENT)
Dept: PREADMISSION TESTING | Age: 21
Discharge: HOME | End: 2021-11-30
Attending: ORTHOPAEDIC SURGERY
Payer: MEDICAID

## 2021-11-30 DIAGNOSIS — Z01.812 PRE-PROCEDURE LAB EXAM: ICD-10-CM

## 2021-11-30 PROCEDURE — U0005 INFEC AGEN DETEC AMPLI PROBE: HCPCS

## 2021-12-01 LAB
SARS-COV-2, XPLCVT: NOT DETECTED
SOURCE, COVRS: NORMAL

## 2021-12-02 ENCOUNTER — ANESTHESIA EVENT (OUTPATIENT)
Dept: SURGERY | Age: 21
End: 2021-12-02
Payer: MEDICAID

## 2021-12-03 ENCOUNTER — ANESTHESIA (OUTPATIENT)
Dept: SURGERY | Age: 21
End: 2021-12-03
Payer: MEDICAID

## 2021-12-03 ENCOUNTER — HOSPITAL ENCOUNTER (OUTPATIENT)
Age: 21
Setting detail: OUTPATIENT SURGERY
Discharge: HOME | End: 2021-12-03
Attending: ORTHOPAEDIC SURGERY | Admitting: ORTHOPAEDIC SURGERY
Payer: MEDICAID

## 2021-12-03 VITALS
WEIGHT: 148 LBS | SYSTOLIC BLOOD PRESSURE: 112 MMHG | HEIGHT: 66 IN | HEART RATE: 48 BPM | RESPIRATION RATE: 10 BRPM | OXYGEN SATURATION: 99 % | TEMPERATURE: 98 F | DIASTOLIC BLOOD PRESSURE: 69 MMHG | BODY MASS INDEX: 23.78 KG/M2

## 2021-12-03 DIAGNOSIS — M25.371 ANKLE INSTABILITY, RIGHT: Primary | ICD-10-CM

## 2021-12-03 PROCEDURE — 76010000149 HC OR TIME 1 TO 1.5 HR: Performed by: ORTHOPAEDIC SURGERY

## 2021-12-03 PROCEDURE — 77030008753 HC TU IRR IN/OUT FLO S&N -B: Performed by: ORTHOPAEDIC SURGERY

## 2021-12-03 PROCEDURE — 29898 ANKLE ARTHROSCOPY/SURGERY: CPT | Performed by: ORTHOPAEDIC SURGERY

## 2021-12-03 PROCEDURE — 76210000021 HC REC RM PH II 0.5 TO 1 HR: Performed by: ORTHOPAEDIC SURGERY

## 2021-12-03 PROCEDURE — 74011250636 HC RX REV CODE- 250/636: Performed by: NURSE ANESTHETIST, CERTIFIED REGISTERED

## 2021-12-03 PROCEDURE — 76210000017 HC OR PH I REC 1.5 TO 2 HR: Performed by: ORTHOPAEDIC SURGERY

## 2021-12-03 PROCEDURE — 77030010509 HC AIRWY LMA MSK TELE -A: Performed by: ANESTHESIOLOGY

## 2021-12-03 PROCEDURE — 77030039497 HC CST PAD STERILE CHCS -A: Performed by: ORTHOPAEDIC SURGERY

## 2021-12-03 PROCEDURE — 74011250636 HC RX REV CODE- 250/636: Performed by: ANESTHESIOLOGY

## 2021-12-03 PROCEDURE — 77030002933 HC SUT MCRYL J&J -A: Performed by: ORTHOPAEDIC SURGERY

## 2021-12-03 PROCEDURE — 74011000250 HC RX REV CODE- 250: Performed by: NURSE ANESTHETIST, CERTIFIED REGISTERED

## 2021-12-03 PROCEDURE — 74011000250 HC RX REV CODE- 250: Performed by: ORTHOPAEDIC SURGERY

## 2021-12-03 PROCEDURE — 2709999900 HC NON-CHARGEABLE SUPPLY: Performed by: ORTHOPAEDIC SURGERY

## 2021-12-03 PROCEDURE — 27698 REPAIR OF ANKLE LIGAMENT: CPT | Performed by: ORTHOPAEDIC SURGERY

## 2021-12-03 PROCEDURE — 76060000033 HC ANESTHESIA 1 TO 1.5 HR: Performed by: ORTHOPAEDIC SURGERY

## 2021-12-03 PROCEDURE — 74011000272 HC RX REV CODE- 272: Performed by: ORTHOPAEDIC SURGERY

## 2021-12-03 PROCEDURE — 77030018834: Performed by: ORTHOPAEDIC SURGERY

## 2021-12-03 PROCEDURE — 74011250637 HC RX REV CODE- 250/637: Performed by: ANESTHESIOLOGY

## 2021-12-03 PROCEDURE — 77030008496 HC TBNG ARTHSC IRR S&N -B: Performed by: ORTHOPAEDIC SURGERY

## 2021-12-03 RX ORDER — SODIUM CHLORIDE, SODIUM LACTATE, POTASSIUM CHLORIDE, CALCIUM CHLORIDE 600; 310; 30; 20 MG/100ML; MG/100ML; MG/100ML; MG/100ML
INJECTION, SOLUTION INTRAVENOUS
Status: DISCONTINUED | OUTPATIENT
Start: 2021-12-03 | End: 2021-12-03 | Stop reason: HOSPADM

## 2021-12-03 RX ORDER — SODIUM CHLORIDE 0.9 % (FLUSH) 0.9 %
5-40 SYRINGE (ML) INJECTION AS NEEDED
Status: DISCONTINUED | OUTPATIENT
Start: 2021-12-03 | End: 2021-12-03 | Stop reason: HOSPADM

## 2021-12-03 RX ORDER — BUPIVACAINE HYDROCHLORIDE 5 MG/ML
INJECTION, SOLUTION EPIDURAL; INTRACAUDAL AS NEEDED
Status: DISCONTINUED | OUTPATIENT
Start: 2021-12-03 | End: 2021-12-03 | Stop reason: HOSPADM

## 2021-12-03 RX ORDER — LIDOCAINE HYDROCHLORIDE 10 MG/ML
0.5 INJECTION, SOLUTION EPIDURAL; INFILTRATION; INTRACAUDAL; PERINEURAL AS NEEDED
Status: DISCONTINUED | OUTPATIENT
Start: 2021-12-03 | End: 2021-12-03 | Stop reason: HOSPADM

## 2021-12-03 RX ORDER — CEFAZOLIN SODIUM 1 G/3ML
INJECTION, POWDER, FOR SOLUTION INTRAMUSCULAR; INTRAVENOUS AS NEEDED
Status: DISCONTINUED | OUTPATIENT
Start: 2021-12-03 | End: 2021-12-03 | Stop reason: HOSPADM

## 2021-12-03 RX ORDER — FENTANYL CITRATE 50 UG/ML
INJECTION, SOLUTION INTRAMUSCULAR; INTRAVENOUS AS NEEDED
Status: DISCONTINUED | OUTPATIENT
Start: 2021-12-03 | End: 2021-12-03 | Stop reason: HOSPADM

## 2021-12-03 RX ORDER — ONDANSETRON 2 MG/ML
INJECTION INTRAMUSCULAR; INTRAVENOUS AS NEEDED
Status: DISCONTINUED | OUTPATIENT
Start: 2021-12-03 | End: 2021-12-03 | Stop reason: HOSPADM

## 2021-12-03 RX ORDER — SODIUM CHLORIDE, SODIUM LACTATE, POTASSIUM CHLORIDE, CALCIUM CHLORIDE 600; 310; 30; 20 MG/100ML; MG/100ML; MG/100ML; MG/100ML
125 INJECTION, SOLUTION INTRAVENOUS CONTINUOUS
Status: DISCONTINUED | OUTPATIENT
Start: 2021-12-03 | End: 2021-12-03 | Stop reason: HOSPADM

## 2021-12-03 RX ORDER — BUPIVACAINE HYDROCHLORIDE AND EPINEPHRINE 5; 5 MG/ML; UG/ML
INJECTION, SOLUTION EPIDURAL; INTRACAUDAL; PERINEURAL AS NEEDED
Status: DISCONTINUED | OUTPATIENT
Start: 2021-12-03 | End: 2021-12-03 | Stop reason: HOSPADM

## 2021-12-03 RX ORDER — PROPOFOL 10 MG/ML
INJECTION, EMULSION INTRAVENOUS AS NEEDED
Status: DISCONTINUED | OUTPATIENT
Start: 2021-12-03 | End: 2021-12-03 | Stop reason: HOSPADM

## 2021-12-03 RX ORDER — MORPHINE SULFATE 2 MG/ML
2 INJECTION, SOLUTION INTRAMUSCULAR; INTRAVENOUS
Status: DISCONTINUED | OUTPATIENT
Start: 2021-12-03 | End: 2021-12-03 | Stop reason: HOSPADM

## 2021-12-03 RX ORDER — MIDAZOLAM HYDROCHLORIDE 1 MG/ML
1 INJECTION, SOLUTION INTRAMUSCULAR; INTRAVENOUS AS NEEDED
Status: DISCONTINUED | OUTPATIENT
Start: 2021-12-03 | End: 2021-12-03 | Stop reason: HOSPADM

## 2021-12-03 RX ORDER — FENTANYL CITRATE 50 UG/ML
50 INJECTION, SOLUTION INTRAMUSCULAR; INTRAVENOUS AS NEEDED
Status: DISCONTINUED | OUTPATIENT
Start: 2021-12-03 | End: 2021-12-03 | Stop reason: HOSPADM

## 2021-12-03 RX ORDER — DEXAMETHASONE SODIUM PHOSPHATE 4 MG/ML
INJECTION, SOLUTION INTRA-ARTICULAR; INTRALESIONAL; INTRAMUSCULAR; INTRAVENOUS; SOFT TISSUE AS NEEDED
Status: DISCONTINUED | OUTPATIENT
Start: 2021-12-03 | End: 2021-12-03 | Stop reason: HOSPADM

## 2021-12-03 RX ORDER — DEXMEDETOMIDINE HYDROCHLORIDE 100 UG/ML
INJECTION, SOLUTION INTRAVENOUS AS NEEDED
Status: DISCONTINUED | OUTPATIENT
Start: 2021-12-03 | End: 2021-12-03 | Stop reason: HOSPADM

## 2021-12-03 RX ORDER — MIDAZOLAM HYDROCHLORIDE 1 MG/ML
1 INJECTION, SOLUTION INTRAMUSCULAR; INTRAVENOUS
Status: DISCONTINUED | OUTPATIENT
Start: 2021-12-03 | End: 2021-12-03 | Stop reason: HOSPADM

## 2021-12-03 RX ORDER — MIDAZOLAM HYDROCHLORIDE 1 MG/ML
INJECTION, SOLUTION INTRAMUSCULAR; INTRAVENOUS AS NEEDED
Status: DISCONTINUED | OUTPATIENT
Start: 2021-12-03 | End: 2021-12-03 | Stop reason: HOSPADM

## 2021-12-03 RX ORDER — FENTANYL CITRATE 50 UG/ML
25 INJECTION, SOLUTION INTRAMUSCULAR; INTRAVENOUS
Status: DISCONTINUED | OUTPATIENT
Start: 2021-12-03 | End: 2021-12-03 | Stop reason: HOSPADM

## 2021-12-03 RX ORDER — SODIUM CHLORIDE 0.9 % (FLUSH) 0.9 %
5-40 SYRINGE (ML) INJECTION EVERY 8 HOURS
Status: DISCONTINUED | OUTPATIENT
Start: 2021-12-03 | End: 2021-12-03 | Stop reason: HOSPADM

## 2021-12-03 RX ORDER — DEXTROSE, SODIUM CHLORIDE, SODIUM LACTATE, POTASSIUM CHLORIDE, AND CALCIUM CHLORIDE 5; .6; .31; .03; .02 G/100ML; G/100ML; G/100ML; G/100ML; G/100ML
125 INJECTION, SOLUTION INTRAVENOUS CONTINUOUS
Status: DISCONTINUED | OUTPATIENT
Start: 2021-12-03 | End: 2021-12-03 | Stop reason: HOSPADM

## 2021-12-03 RX ORDER — ONDANSETRON 2 MG/ML
4 INJECTION INTRAMUSCULAR; INTRAVENOUS AS NEEDED
Status: DISCONTINUED | OUTPATIENT
Start: 2021-12-03 | End: 2021-12-03 | Stop reason: HOSPADM

## 2021-12-03 RX ORDER — LIDOCAINE HYDROCHLORIDE 20 MG/ML
INJECTION, SOLUTION EPIDURAL; INFILTRATION; INTRACAUDAL; PERINEURAL AS NEEDED
Status: DISCONTINUED | OUTPATIENT
Start: 2021-12-03 | End: 2021-12-03 | Stop reason: HOSPADM

## 2021-12-03 RX ORDER — ACETAMINOPHEN 325 MG/1
650 TABLET ORAL ONCE
Status: COMPLETED | OUTPATIENT
Start: 2021-12-03 | End: 2021-12-03

## 2021-12-03 RX ORDER — OXYCODONE HYDROCHLORIDE 5 MG/1
5 TABLET ORAL AS NEEDED
Status: DISCONTINUED | OUTPATIENT
Start: 2021-12-03 | End: 2021-12-03 | Stop reason: HOSPADM

## 2021-12-03 RX ORDER — OXYCODONE AND ACETAMINOPHEN 5; 325 MG/1; MG/1
1 TABLET ORAL
Qty: 20 TABLET | Refills: 0 | Status: SHIPPED | OUTPATIENT
Start: 2021-12-03 | End: 2021-12-06

## 2021-12-03 RX ORDER — DIPHENHYDRAMINE HYDROCHLORIDE 50 MG/ML
12.5 INJECTION, SOLUTION INTRAMUSCULAR; INTRAVENOUS AS NEEDED
Status: DISCONTINUED | OUTPATIENT
Start: 2021-12-03 | End: 2021-12-03 | Stop reason: HOSPADM

## 2021-12-03 RX ADMIN — PROPOFOL 200 MG: 10 INJECTION, EMULSION INTRAVENOUS at 10:16

## 2021-12-03 RX ADMIN — SODIUM CHLORIDE, POTASSIUM CHLORIDE, SODIUM LACTATE AND CALCIUM CHLORIDE 125 ML/HR: 600; 310; 30; 20 INJECTION, SOLUTION INTRAVENOUS at 08:32

## 2021-12-03 RX ADMIN — PROPOFOL 200 MG: 10 INJECTION, EMULSION INTRAVENOUS at 10:17

## 2021-12-03 RX ADMIN — FENTANYL CITRATE 50 MCG: 50 INJECTION, SOLUTION INTRAMUSCULAR; INTRAVENOUS at 10:35

## 2021-12-03 RX ADMIN — DEXMEDETOMIDINE HYDROCHLORIDE 12 MCG: 100 INJECTION, SOLUTION, CONCENTRATE INTRAVENOUS at 11:12

## 2021-12-03 RX ADMIN — CEFAZOLIN 2 G: 330 INJECTION, POWDER, FOR SOLUTION INTRAMUSCULAR; INTRAVENOUS at 10:22

## 2021-12-03 RX ADMIN — ACETAMINOPHEN 650 MG: 325 TABLET ORAL at 08:34

## 2021-12-03 RX ADMIN — MIDAZOLAM 2 MG: 1 INJECTION INTRAMUSCULAR; INTRAVENOUS at 10:09

## 2021-12-03 RX ADMIN — DEXAMETHASONE SODIUM PHOSPHATE 4 MG: 4 INJECTION, SOLUTION INTRAMUSCULAR; INTRAVENOUS at 10:28

## 2021-12-03 RX ADMIN — LIDOCAINE HYDROCHLORIDE 60 MG: 20 INJECTION, SOLUTION EPIDURAL; INFILTRATION; INTRACAUDAL; PERINEURAL at 10:16

## 2021-12-03 RX ADMIN — ONDANSETRON HYDROCHLORIDE 4 MG: 2 INJECTION, SOLUTION INTRAMUSCULAR; INTRAVENOUS at 11:10

## 2021-12-03 RX ADMIN — SODIUM CHLORIDE, POTASSIUM CHLORIDE, SODIUM LACTATE AND CALCIUM CHLORIDE: 600; 310; 30; 20 INJECTION, SOLUTION INTRAVENOUS at 10:04

## 2021-12-03 NOTE — OP NOTES
[address]   OPERATIVE REPORT    Name:  Scarlet Plunkett  MR#:  [id number]  :  2000  ACCOUNT #:  [de-identified]  DATE OF SERVICE:  2021      PREOPERATIVE DIAGNOSIS:  Chronic right ankle instability. POSTOPERATIVE DIAGNOSIS:  Chronic right ankle instability. PROCEDURE PERFORMED:  1. Right ankle arthroscopy with debridement of lateral gutter. 2.  Brostrom repair, right ankle. 3.  Short-leg cast application. SURGEON:  Ravin Kovacs MD    ASSISTANT:  David Fournier SA    ANESTHESIA:  General.    COMPLICATIONS:  None. SPECIMENS REMOVED:  None. IMPLANTS:  None. ESTIMATED BLOOD LOSS:  Minimal.    POSITION:  Supine. EXPLANTS:  None. C-ARM:  No.    ARTHROSCOPY:  Yes. CELL SAVER:  No.    SPINAL CORD MONITORING:  No.    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 Risks and benefits of operative intervention were discussed with him and his family. They state they understand and wished to proceed. PROCEDURE:  The patient was approached supine. After obtaining adequate anesthesia, he was given IV antibiotics. His knee was examined under anesthesia. He had a significant anterior drawer both in dorsiflexion neutral and plantar flexion. He had good subtalar motion. His ankle was stable to varus and valgus stress. Tourniquet was applied to right upper thigh. He underwent routine prep and drape. Limb was elevated, exsanguinated. Tourniquet was inflated. Ankle joint was insufflated with Marcaine epinephrine solution. Standard medial and lateral portals were established. Care was taken to avoid injury to the neurovascular structures and the ankle joint inspected systematically. His articular surfaces were in good shape. No loose bodies were identified in the pouch. There was no impingement.   In the lateral gutter, he had a significant amount of synovitis and inflammatory tissue which was impinging laterally and in the gutter and this was gently debrided back to a smooth stable border. Arthroscopy instrumentation was then removed. Using the lateral portal, this incision was extended proximally and distally and the anterior talofibular ligament and calcaneofibular ligament were identified. They were elevated off the fibular insertion and a Brostrom repair to the periosteum was performed with the foot dorsiflexed and everted. Nonabsorbable suture was utilized. His ankle was very stable after this with a negative drawer. Wounds were closed in layers. Monocryl was used on the skin. Sterile dressing was applied followed by well-padded short-leg cast in dorsiflexion and eversion.         Nasir Shaikh MD      [id number]  D:  12/03/2021 11:13  T:  12/03/2021 11:55  JOB #:  [id number]

## 2021-12-03 NOTE — ANESTHESIA POSTPROCEDURE EVALUATION
Procedure(s):  RIGHT ANKLE ARTHROSCOPY WITH BROSTROM. general    Anesthesia Post Evaluation        Patient location during evaluation: PACU  Patient participation: complete - patient participated  Level of consciousness: awake and alert  Pain management: adequate  Airway patency: patent  Anesthetic complications: no  Cardiovascular status: acceptable  Respiratory status: acceptable  Hydration status: acceptable  Comments: I have seen and evaluated the patient and is ready for discharge. Rajan Perez MD    Post anesthesia nausea and vomiting:  none      INITIAL Post-op Vital signs:   Vitals Value Taken Time   /62 12/03/21 1245   Temp 36.2 °C (97.1 °F) 12/03/21 1122   Pulse 57 12/03/21 1258   Resp 14 12/03/21 1258   SpO2 100 % 12/03/21 1258   Vitals shown include unvalidated device data.

## 2021-12-03 NOTE — DISCHARGE INSTRUCTIONS
Lower Extremity Discharge Instructions      Apply ice for 48 - 72 hours. Elevate above the heart for 48 - 72 hours. Leave dressing in place until follow up, Strict None Weight Bearing  and Crutch training (Physical Therapy to instruct)    Cast or Splint Care: After Your Visit  Your Care Instructions  Your doctor has applied a cast or splint to protect a broken bone or other injury. Follow your doctor's instructions on when you can first put weight or pressure on your limb. Fiberglass casts and splints dry quickly, but plaster casts or splints may take a few days to dry completely. Do not put any weight on a plaster cast or splint for the first 48 hours. After that, do not stand or walk on it unless it is designed for walking. Follow-up care is a key part of your treatment and safety. Be sure to make and go to all appointments, and call your doctor if you are having problems. Itâs also a good idea to know your test results and keep a list of the medicines you take. How can you care for yourself at home? · Prop up the injured arm or leg on a pillow when you ice it or anytime you sit or lie down during the next 3 days. Try to keep it above the level of your heart. This will help reduce swelling. · Put ice or cold packs on the hurt area for 10 to 20 minutes at a time. Try to do this every 1 to 2 hours for the next 3 days (when you are awake) or until the swelling goes down. Be careful not to get the cast or splint wet. · Take pain medicines exactly as directed. ¨ If the doctor gave you a prescription medicine for pain, take it as prescribed. ¨ If you are not taking a prescription pain medicine, ask your doctor if you can take an over-the-counter medicine. ¨ Do not take two or more pain medicines at the same time unless the doctor told you to. Many pain medicines have acetaminophen, which is Tylenol. Too much acetaminophen (Tylenol) can be harmful. · Do not give aspirin to anyone younger than 20.  It has been linked to Reye syndrome, a serious illness. · If you have a cast or splint on your arm, wiggle your uninjured fingers as much as possible. If you have a cast or splint on your leg or foot, wiggle your toes. · Keep your cast or splint as dry as possible. Cover it with at least two layers of plastic when you bathe. Water can collect under the cast or splint and cause skin soreness and itching. If you have a wound or have had surgery, water can increase the risk of infection. · Blowing cool air from a hair dryer or fan into the cast or splint may help relieve itching. Never stick items under your cast or splint to scratch the skin. · Do not use oils or lotions near your cast or splint. If the skin becomes red or sore around the edge of the cast or splint, you may pad the edges with a soft material, such as moleskin, or use tape to cover them. · Never cut or alter your cast or splint. · Do not use powder on the skin under the cast or splint. · Keep dirt or sand from getting into the cast or splint. When should you call for help? Call your doctor now or seek immediate medical care if:  · You have increased or severe pain. · Your foot or hand is cool or pale or changes color. · You have tingling, weakness, or numbness in your hand or foot. · Your cast or splint feels too tight. · You have signs of a blood clot, such as:  ¨ Pain in your calf, back of the knee, thigh, or groin. ¨ Redness and swelling in your leg or groin. · You have a fever, drainage, or a bad smell coming from the cast or splint. Watch closely for changes in your health, and be sure to contact your doctor if:  · The skin under your cast or splint burns or stings. Where can you learn more? Go to [employer]. Enter M812 in the search box to learn more about \"Cast or Splint Care: After Your Visit. \"   © 2718-7221 [employer].  Care instructions adapted under license by [employer] (which disclaims liability or warranty for this information). This care instruction is for use with your licensed healthcare professional. If you have questions about a medical condition or this instruction, always ask your healthcare professional. Jennifer Caballero any warranty or liability for your use of this information. [phone]                  LEG AND ARM CAST CARE      Rest:  Follow the activity guidelines given to you by the nurse or physician. For most children, you can encourage rest and know that they usually are not willing to do things that will cause them pain. Follow the instructions on the use of crutches, non-weight bearing, or other ambulatory aides that are recommended. Children under the age of eight are not usually capable of using crutches. Ice:    Apply ice every 15 to 20 minutes with15 to 20 minute breaks between ice sessions. (Fifteen minutes on cast, fifteen minutes off). You may use ice therapy for as long as you feel it brings comfort to you or your child. Never place ice directly on the skin. Ice therapy with children under the age of six is usually difficult and not recommended. Remember not to get the cast wet. Elevation:  Elevate the injured area using pillows or cushions. Elevation works best if the affected limb is kept higher than the heart. Exercise toes or fingers by wiggling them back and forth many times during the day. This improves circulation and decreases swelling. Pain Medication:  Take any prescription medications as directed. You may use plain Tylenol (Acetaminophen) instead of a prescription pain med. Follow the directions on the bottle. Do not use Motrin, Ibuprofen, Advil or Aleve if you are recovering from bone injury or bone surgery. These types of meds are known to slow the healing of bone.     CAST CARE - DO NOTS    Do Not -get the cast wet or dirty (no sand or mulch piles)   Do Not -put anything inside the cast   Do Not -put powder, lotions or fragrances inside the cast   Do Not -pull out protective padding   Do Not -allow the patient to walk on the leg cast without wearing the    cast shoe    ITCHING     Air can flow through the cast due to the weave of the fiberglass. Blow air from a hairdryer set on low/cool (make sure it is not too hot on the skin by placing your hand in the flow of the air while you dry). You may also use a vacuum  hose to blow air over the cast.     Rub or pinch the opposite leg (if leg fracture) or opposite arm (if arm fracture).  If the itching is severe, give over the counter Benadryl for children over six years of age. See the chart on the package to determine how much to give your child.  Knock on the cast.  Itching is caused by loose, dead skin in the cast.  The skin that usually is shed has no where to go. SKIN CARE     At least once a day check the skin around the edges of the cast for any reddened or irritated areas. The skin between the thumb and the cast is often a problem area. You may use an emery board or sand paper to take off the sharp edges. Medical tape, and/or duct tape, or a product called mole skin, can be used to cover the rough edges of the cast. You will find this in any drugstore.  You may use alcohol on a Q-tip to clean the edge of skin around the cast.      BATHS     To keep water out of the cast a bath is better than a shower.  Wrap the cast with a plastic covering. Sometimes it helps to use a womens nylon knee-hi to keep the plastic in place. You can also use Glad Press-N-Seal around the cast with a bag over this.  If the cast does not come above the knee it may be possible to bathe in a shallow tub. Rest the casted leg on the side of the tub, but be careful to keep the cast out of the water.  A sponge bath should be given if the cast comes above the knee.    If the cast gets wet, dry it thoroughly with a fan or a hairdryer set on cool.  The surface of the cast can be wiped clean with a damp cloth or a toothbrush. WATCH FOR     Any cracks, breaks or soft spots in cast.   Extreme color change or swelling of fingers or toes.  Complaints of tingling, pins and needles, or numbness.  Unusual or very bad odor coming from the cast.   Extreme coldness of fingers or toes.  Decrease in ability to move fingers or toes.  Repeated complaints of discomfort in the same area. CAST REMOVAL    Children should be told that the cast will be removed with a cast cutter. The cast cutter makes a lot of noise and can be scary. It is louder than a vacuum  and looks like a saw with a round blade. The blade only vibrates and does not spin around. Often the vibration of the blade causes a tickling sensation and sometimes heat can be felt. Geovanna Rangel Very young children should only be told about the cast saw or buzzer immediately before use and the parent should hold and comfort them. The nurse will help you with this.  Preschoolers may be told about the cast saw or buzzer when they get to the cast room. Most preschoolers will laugh with the [address] but will still worry. A parent nearby helps.  School age children may be told about the cast saw or buzzer the day before coming to the cast room. This age wants to know what they are going to see, hear and feel.    ______________________________________________________________________    Anesthesia Discharge Instructions    After general anesthesia or intervenous sedation, for 24 hours or while taking prescription Narcotics:  · Limit your activities  · Do not drive or operate hazardous machinery  · If you have not urinated within 8 hours after discharge, please contact your surgeon on call.   · Do not make important personal or business decisions  · Do not drink alcoholic beverages    Report the following to your surgeon:  · Excessive pain, swelling, redness or odor of or around the surgical area  · Temperature over 100.5 degrees  · Nausea and vomiting lasting longer than 4 hours or if unable to take medication  · Any signs of decreased circulation or nerve impairment to extremity:  Change in color, persistent numbness, tingling, coldness or increased pain.   · Any questions

## 2021-12-06 ENCOUNTER — OFFICE VISIT (OUTPATIENT)
Dept: ORTHOPEDIC SURGERY | Age: 21
End: 2021-12-06
Payer: MEDICAID

## 2021-12-06 VITALS — BODY MASS INDEX: 23.3 KG/M2 | WEIGHT: 145 LBS | HEIGHT: 66 IN

## 2021-12-06 DIAGNOSIS — G89.29 CHRONIC PAIN OF RIGHT ANKLE: Primary | ICD-10-CM

## 2021-12-06 DIAGNOSIS — M25.571 CHRONIC PAIN OF RIGHT ANKLE: Primary | ICD-10-CM

## 2021-12-06 PROCEDURE — 99024 POSTOP FOLLOW-UP VISIT: CPT | Performed by: ORTHOPAEDIC SURGERY

## 2021-12-06 PROCEDURE — 29405 APPL SHORT LEG CAST: CPT | Performed by: ORTHOPAEDIC SURGERY

## 2021-12-06 NOTE — PROGRESS NOTES
12/06/21 1015   Physical Condition (summarize discussion in comments)   Nausea / Vomiting? No   Have you had a fever? No   Problems voiding? No   Have you had draining or bleeding? No     Pt mother said pt feels his cast is tight.  To speak with Dr Kindra Schumacher office about being seen today to assess the cast.

## 2021-12-29 ENCOUNTER — OFFICE VISIT (OUTPATIENT)
Dept: ORTHOPEDIC SURGERY | Age: 21
End: 2021-12-29
Payer: MEDICAID

## 2021-12-29 VITALS — HEIGHT: 66 IN | BODY MASS INDEX: 23.3 KG/M2 | WEIGHT: 145 LBS

## 2021-12-29 DIAGNOSIS — M25.371 ANKLE INSTABILITY, RIGHT: Primary | ICD-10-CM

## 2021-12-29 PROCEDURE — 99024 POSTOP FOLLOW-UP VISIT: CPT | Performed by: ORTHOPAEDIC SURGERY

## 2021-12-29 NOTE — PROGRESS NOTES
Brielle Gibbs (: 2000) is a 22 y.o. male patient, here for evaluation of the following chief complaint(s):  Surgical Follow-up (right ankle brostrom )       ASSESSMENT/PLAN:  Below is the assessment and plan developed based on review of pertinent history, physical exam, labs, studies, and medications. Rodríguez doing well lace up ankle brace which she has at home formal physical therapy check him back here in a month      1. Ankle instability, right      No follow-ups on file. SUBJECTIVE/OBJECTIVE:  Brielle Gibbs (: 2000) is a 22 y.o. male who presents today for the following:  Chief Complaint   Patient presents with    Surgical Follow-up     right ankle darrius        Doing well ankle repair Willie Fanning here for the cast removal    IMAGING:  None    Allergies   Allergen Reactions    Nectarine Itching       Current Outpatient Medications   Medication Sig    ibuprofen (MOTRIN) 600 mg tablet Take 1 Tab by mouth every six (6) hours as needed for Pain. (Patient not taking: Reported on 11/15/2021)     No current facility-administered medications for this visit. History reviewed. No pertinent past medical history. Past Surgical History:   Procedure Laterality Date    HX ANKLE FRACTURE TX Right 2021    BROSTROM       History reviewed. No pertinent family history. Social History     Tobacco Use    Smoking status: Never Smoker    Smokeless tobacco: Never Used   Substance Use Topics    Alcohol use: No        Review of Systems  ROS     Positive for: Musculoskeletal (right ankle brostrom )    Negative for: Constitutional, Gastrointestinal, Neurological, Skin, Genitourinary, HENT, Endocrine, Cardiovascular, Eyes, Respiratory, Psychiatric, Allergic/Imm, Heme/Lymph    Last edited by Rosmery Luu RN on 2021  3:49 PM. (History)         No flowsheet data found.        Vitals:  Ht 5' 6\" (1.676 m)   Wt 145 lb (65.8 kg)   BMI 23.40 kg/m²    Body mass index is 23.4 kg/m². Physical Exam    Wounds look good ankle stable negative drawer EHL and anterior tib are intact      An electronic signature was used to authenticate this note.   -- Yovana Heath MD

## 2022-02-01 ENCOUNTER — HOSPITAL ENCOUNTER (OUTPATIENT)
Dept: PHYSICAL THERAPY | Age: 22
Discharge: HOME | End: 2022-02-01
Payer: MEDICAID

## 2022-02-01 PROCEDURE — 97162 PT EVAL MOD COMPLEX 30 MIN: CPT | Performed by: PHYSICAL THERAPIST

## 2022-02-01 NOTE — PROGRESS NOTES
Physical Therapy at [facility],   a part of [address] [place] [address]  [address] [address], [address] [place] [address], [address]  Phone: [phone]  Fax: [phone]    Plan of Care/Statement of Necessity for Physical Therapy Services  2-15    Patient name: Calos Talavera  : 2000  Provider#: [id number]  Referral source: [facility], Not On File, MD      Medical/Treatment Diagnosis: Right ankle pain [M25.571]     Prior Hospitalization: see medical history     Comorbidities: R distal tibial fracture   Prior Level of Function: able to run Div. 1 rack without difficulty  Medications: Verified on Patient Summary List    Start of Care: 23      Onset Date: 12/3/21       The Plan of Care and following information is based on the information from the initial evaluation. Assessment/ key information: Patient reports to PT appoximately 8.5 weeks s/p R Bronstrom repair. He is doing well with weight bearing tolerance within the CAM boot, however his ankle ROM is significantly behind at this time. He is hypersensitive around the lateral ankle as well, and this is also limiting some function. Will focus most on restoring ankle mobility initially to help catch him back up and then begin to work on more weight bearing exercises per tolerance. Evaluation Complexity History MEDIUM  Complexity : 1-2 comorbidities / personal factors will impact the outcome/ POC ; Examination HIGH Complexity : 4+ Standardized tests and measures addressing body structure, function, activity limitation and / or participation in recreation  ;Presentation MEDIUM Complexity : Evolving with changing characteristics  ; Clinical Decision Making MEDIUM Complexity : FOTO score of 26-74  Overall Complexity Rating: MEDIUM    Problem List: pain affecting function, decrease ROM, decrease strength, edema affecting function, impaired gait/ balance, decrease ADL/ functional abilitiies, decrease activity tolerance and decrease flexibility/ joint mobility   Treatment Plan may include any combination of the following: Therapeutic exercise, Therapeutic activities, Neuromuscular re-education, Physical agent/modality, Gait/balance training, Manual therapy, Patient education and Self Care training  Patient / Family readiness to learn indicated by: asking questions and interest  Persons(s) to be included in education: patient (P)  Barriers to Learning/Limitations: None  Patient Goal (s): to run again  Patient Self Reported Health Status: excellent  Rehabilitation Potential: good    Short Term Goals: To be accomplished in 4-8 treatments:   1. Pt will be compliant with initial HEP and PT attendance   2. Pt will be able to demonstrate 0deg ankle DF with knee extended   3. Pt will be able to demonstrate at least 10deg ankle eversion and inversion without pain    Long Term Goals: To be accomplished in 18-24 treatments:   1. Pt will be able to walk at school between classes without increase in symptoms   2. Pt will be able to begin light jogging without increase in symptoms   3. Pt will demonstrate at least 10deg ankle DF for improved functional mobility   4. Pt will be able to self-manage care using updated HEP for improved independence   5. Pt will be able to perform single limb hopping drills without increase in discomfort   6. Improved FOTO score to 70 or better to demonstrate improved function    Frequency / Duration: Patient to be seen 2 times per week for 18-24 treatments. Patient/ Caregiver education and instruction: self care and exercises    [x]  Plan of care has been reviewed with DANIELA Arzola PT, DPT 2/1/2022     ________________________________________________________________________    I certify that the above Therapy Services are being furnished while the patient is under my care. I agree with the treatment plan and certify that this therapy is necessary.     Physician's Signature:____________________ Date:____________Time: _________      Liban Lockwood, Not On File, MD

## 2022-02-01 NOTE — PROGRESS NOTES
PT INITIAL EVALUATION NOTE - [facility] 2-15    Patient Name: Curly Fay  NKPP:4/3/[birth year]14  : 2000  [x]  Patient  Verified  Payor: [employer]ICA[employer] / Plan: [address] [place] [address] / Product Type: Managed Care Medicaid /    In time: [id number]  Out time: 110p  Total Treatment Time (min): 55  Total Timed Codes (min): 5  1:1 Treatment Time ( only): 5   Visit #: 1     Treatment Area: Right ankle pain [M25.571]    SUBJECTIVE  Pain Level (0-10 scale): 0  Any medication changes, allergies to medications, adverse drug reactions, diagnosis change, or new procedure performed?: [] No    [x] Yes (see summary sheet for update)  Subjective:    Patient reports s/p Bronstrom repair on 12/3/21. He initially injured sprained the ankle in November prior to having the surgery. He was in a cast initially and has been in a CAM boot since 22. He is scheduled to go back and see Dr. Ernst Thrasher on 22. He notices that the ankle will swell up on him when he is up and walking on it for a long period of time. He runs track at [address] [place] [address] and has been redshirted this year. 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 Description of symptoms: lateral ankle pain  Aggravating Factors: movement, standing/walking  Alleviating Factors: Tylenol, ice, elevation, rest    OBJECTIVE/EXAMINATION  Other Observations: Ankle in CAM boot  Gait and Functional Mobility:  Ambulating without use of crutches, no significant limp noted  Palpation: Significant TTP to light touch generally around lateral ankle ligaments, particularly over calcaneo-fibular and ATF regions. Most tender over incision specifically.      ANKLE ROM:   R     Ankle resting in -35deg ankle DF     DF: knee ext.  -25deg             Knee 90:  0deg      PF   45deg      Ev   0deg      Inv   0deg       Joint Mobility Assessment: Talocrural: unable to assess    Flexibility: Calf: notably tight with ankle mobility, unable to directly assess   Hamstring: -30deg 90/90 testing    MMT: deferred  Neurological: Sensations: altered light touch sensation (hypersensitive, painful to light touch around incision)    Special Tests: deferred    Modality rationale: decrease edema, decrease inflammation and decrease pain to improve the patients ability to walk without pain   Min Type Additional Details    [] Estim: []Att   []Unatt        []TENS instruct                  []IFC  []Premod   []NMES                     []Other:  []w/US   []w/ice   []w/heat  Position:  Location:    []  Traction: [] Cervical       []Lumbar                       [] Prone          []Supine                       []Intermittent   []Continuous Lbs:  [] before manual  [] after manual  []w/heat    []  Ultrasound: []Continuous   [] Pulsed at:                           []1MHz   []3MHz Location:  W/cm2:    [] Paraffin         Location:   []w/heat    []  Ice     []  Heat  []  Ice massage Position:  Location:    []  Laser  []  Other: Position:  Location:     10 [x]  Vasopneumatic Device Pressure:       [] lo [x] med [] hi   Temperature: 34deg     [x] Skin assessment post-treatment:  [x]intact []redness- no adverse reaction    []redness - adverse reaction:     5 min Therapeutic Exercise:  [x] See flow sheet :   Rationale: increase ROM and increase strength to improve the patients ability to walk without pain          With   [] TE   [] TA   [] Neuro   [] SC   [] other: Patient Education: [x] Review HEP    [] Progressed/Changed HEP based on:   [] positioning   [] body mechanics   [] transfers   [] heat/ice application    [] other:      Other Objective/Functional Measures: FOTO Functional Measure: 37/100    Pain Level (0-10 scale) post treatment: 0    ASSESSMENT/Changes in Function:     [x]  See Plan of RAJAN Infante DPT 2/1/2022

## 2022-02-08 ENCOUNTER — HOSPITAL ENCOUNTER (OUTPATIENT)
Dept: PHYSICAL THERAPY | Age: 22
Discharge: HOME | End: 2022-02-08
Payer: MEDICAID

## 2022-02-08 PROCEDURE — 97016 VASOPNEUMATIC DEVICE THERAPY: CPT | Performed by: PHYSICAL THERAPIST

## 2022-02-08 PROCEDURE — 97110 THERAPEUTIC EXERCISES: CPT | Performed by: PHYSICAL THERAPIST

## 2022-02-08 PROCEDURE — 97140 MANUAL THERAPY 1/> REGIONS: CPT | Performed by: PHYSICAL THERAPIST

## 2022-02-08 NOTE — PROGRESS NOTES
PT DAILY TREATMENT NOTE - [facility] 2-15    Patient Name: Negro Fritz  Date:2022  : 2000  [x]  Patient  Verified  Payor: BLUE [employer]OSS MEDICAID / Plan: [facility] / Product Type: Managed Care Medicaid /    In time: 105p  Out time:203p  Total Treatment Time (min): 58  Total Timed Codes (min): 48  1:1 Treatment Time ( only): 37   Visit #:  2    Treatment Area: Right ankle pain [M25.571]    SUBJECTIVE  Pain Level (0-10 scale): 0  Any medication changes, allergies to medications, adverse drug reactions, diagnosis change, or new procedure performed?: [x] No    [] Yes (see summary sheet for update)  Subjective functional status/changes:   [] No changes reported  Doing well today. No issues with the exercises and stretches at home.  Saw Dr. Mirian Crawford last Thursday; he told him the ankle is still really sensitive, and told him to stay in the boot until he is 'done' with PT.     OBJECTIVE    Modality rationale: decrease pain and increase tissue extensibility to improve the patients ability to walk without pain   Min Type Additional Details       [] Estim: []Att   []Unatt    []TENS instruct                  []IFC  []Premod   []NMES                     []Other:  []w/US   []w/ice   []w/heat  Position:  Location:       []  Traction: [] Cervical       []Lumbar                       [] Prone          []Supine                       []Intermittent   []Continuous Lbs:  [] before manual  [] after manual  []w/heat    []  Ultrasound: []Continuous   [] Pulsed                       at: []1MHz   []3MHz Location:  W/cm2:    [] Paraffin         Location:   []w/heat    []  Ice     []  Heat  []  Ice massage Position:  Location:    []  Laser  []  Other: Position:  Location:     10 []  Vasopneumatic Device Pressure:       [] lo [x] med [] hi   Temperature: 34deg     [x] Skin assessment post-treatment:  [x]intact []redness- no adverse reaction    []redness  adverse reaction:     40 min Therapeutic Exercise:  [x] See flow sheet :   Rationale: increase ROM, increase strength and improve balance to improve the patients ability to walk without pain    8 min Manual Therapy: desensitization with towel and light touch around lateral ankle, gentle scar massage    Rationale: decrease pain, increase ROM, decrease trigger points and desensitize skin to improve the patients ability to walk without pain          With   [] TE   [] TA   [] Neuro   [] SC   [] other: Patient Education: [x] Review HEP    [] Progressed/Changed HEP based on:   [] positioning   [] body mechanics   [] transfers   [] heat/ice application    [] other:      Other Objective/Functional Measures: CKC DF: 10deg, PROM DF: 0deg     Pain Level (0-10 scale) post treatment: 3    ASSESSMENT/Changes in Function:   Reported some pain with AROM towel slides for inversion (gentle) and eversion today. Remains hypersensitive over lateral ankle and over incision. Discussed having him continue to work on desensitization training at home as well. Will reach out to Dr. Ranulfo Huerta regarding weight bearing status. Patient will continue to benefit from skilled PT services to modify and progress therapeutic interventions, address functional mobility deficits, address ROM deficits, address strength deficits, analyze and address soft tissue restrictions and analyze and cue movement patterns to attain remaining goals. []  See Plan of Care  []  See progress note/recertification  []  See Discharge Summary         Progress towards goals / Updated goals:    Short Term Goals: To be accomplished in 4-8 treatments:               1. Pt will be compliant with initial HEP and PT attendance MET               2. Pt will be able to demonstrate 0deg ankle DF with knee extended MET               3. Pt will be able to demonstrate at least 10deg ankle eversion and inversion without pain     Long Term Goals:  To be accomplished in 18-24 treatments:               1. Pt will be able to walk at school between classes without increase in symptoms              2. Pt will be able to begin light jogging without increase in symptoms              3. Pt will demonstrate at least 10deg ankle DF for improved functional mobility              4. Pt will be able to self-manage care using updated HEP for improved independence              5. Pt will be able to perform single limb hopping drills without increase in discomfort              6. Improved FOTO score to 70 or better to demonstrate improved function    PLAN  [x]  Upgrade activities as tolerated     [x]  Continue plan of care  []  Update interventions per flow sheet       []  Discharge due to:_  []  Other:_      Stefany Hudson, PT, DPT 2/8/2022

## 2022-02-10 ENCOUNTER — HOSPITAL ENCOUNTER (OUTPATIENT)
Dept: PHYSICAL THERAPY | Age: 22
Discharge: HOME | End: 2022-02-10
Payer: MEDICAID

## 2022-02-10 PROCEDURE — 97110 THERAPEUTIC EXERCISES: CPT

## 2022-02-10 PROCEDURE — 97016 VASOPNEUMATIC DEVICE THERAPY: CPT

## 2022-02-10 NOTE — PROGRESS NOTES
PT DAILY TREATMENT NOTE - [facility] 2-15    Patient Name: Jefferson Rascon  Date:2/10/2022  : 2000  [x]  Patient  Verified  Payor: BLUE CROSS MEDICAID / Plan: [address] [place] [address] / Product Type: Managed Care Medicaid /    In time: 3:45P  Out time: 4:40P  Total Treatment Time (min): 55  Total Timed Codes (min): 40  1:1 Treatment Time ( only): 40  Visit #:  3    Treatment Area: Right ankle pain [M25.571]    SUBJECTIVE  Pain Level (0-10 scale): 8/10  Any medication changes, allergies to medications, adverse drug reactions, diagnosis change, or new procedure performed?: [x] No    [] Yes (see summary sheet for update)  Subjective functional status/changes:   [] No changes reported  Pt reported falling while rushing to get up the stairs at school because he was running late to class. Fell down about 4-5 stairs. Felt instant increase in pain no pop felt or heard.      OBJECTIVE    Modality rationale: decrease pain and increase tissue extensibility to improve the patients ability to walk without pain   Min Type Additional Details       [] Estim: []Att   []Unatt    []TENS instruct                  []IFC  []Premod   []NMES                     []Other:  []w/US   []w/ice   []w/heat  Position:  Location:       []  Traction: [] Cervical       []Lumbar                       [] Prone          []Supine                       []Intermittent   []Continuous Lbs:  [] before manual  [] after manual  []w/heat    []  Ultrasound: []Continuous   [] Pulsed                       at: []1MHz   []3MHz Location:  W/cm2:    [] Paraffin         Location:   []w/heat    []  Ice     []  Heat  []  Ice massage Position:  Location:    []  Laser  []  Other: Position:  Location:     10 []  Vasopneumatic Device Pressure:       [] lo [x] med [] hi   Temperature: 34deg     [x] Skin assessment post-treatment:  [x]intact []redness- no adverse reaction    []redness  adverse reaction:     40 min Therapeutic Exercise:  [x] See flow sheet : Rationale: increase ROM, increase strength and improve balance to improve the patients ability to walk without pain            With   [] TE   [] TA   [] Neuro   [] SC   [] other: Patient Education: [x] Review HEP    [] Progressed/Changed HEP based on:   [] positioning   [] body mechanics   [] transfers   [] heat/ice application    [] other:      Other Objective/Functional Measures: --     Pain Level (0-10 scale) post treatment: 6/10    ASSESSMENT/Changes in Function:   Noted swelling along lateral malleolus. consulted with Mayelin Velazquez PT, DPT for further assessment in pt's status change. Unable to conclude any further changes due to hypersensitivity to touch and pressure. Kept exercises to intrinsics and gentle ROM within tolerance with emphasis on continuing desensitization to incision. Educate on pain management. Advised pt to reach out to MD office tomorrow to update them on current status. Will also inform evaluating therapist Gi Kearney PT, DPT  Patient will continue to benefit from skilled PT services to modify and progress therapeutic interventions, address functional mobility deficits, address ROM deficits, address strength deficits, analyze and address soft tissue restrictions and analyze and cue movement patterns to attain remaining goals. []  See Plan of Care  []  See progress note/recertification  []  See Discharge Summary         Progress towards goals / Updated goals:    Short Term Goals: To be accomplished in 4-8 treatments:               1. Pt will be compliant with initial HEP and PT attendance MET               2. Pt will be able to demonstrate 0deg ankle DF with knee extended MET               3. Pt will be able to demonstrate at least 10deg ankle eversion and inversion without pain     Long Term Goals:  To be accomplished in 18-24 treatments:               1. Pt will be able to walk at school between classes without increase in symptoms              2. Pt will be able to begin light jogging without increase in symptoms              3. Pt will demonstrate at least 10deg ankle DF for improved functional mobility              4. Pt will be able to self-manage care using updated HEP for improved independence              5. Pt will be able to perform single limb hopping drills without increase in discomfort              6. Improved FOTO score to 70 or better to demonstrate improved function    PLAN  [x]  Upgrade activities as tolerated     [x]  Continue plan of care  []  Update interventions per flow sheet       []  Discharge due to:_  []  Other:_      Carley Aguilar, PTA, OPTA, AIB-VR 2/10/2022

## 2022-02-15 ENCOUNTER — HOSPITAL ENCOUNTER (OUTPATIENT)
Dept: PHYSICAL THERAPY | Age: 22
Discharge: HOME | End: 2022-02-15
Payer: MEDICAID

## 2022-02-15 PROCEDURE — 97140 MANUAL THERAPY 1/> REGIONS: CPT

## 2022-02-15 PROCEDURE — 97112 NEUROMUSCULAR REEDUCATION: CPT

## 2022-02-15 PROCEDURE — 97110 THERAPEUTIC EXERCISES: CPT

## 2022-02-15 NOTE — PROGRESS NOTES
PT DAILY TREATMENT NOTE - [facility] 2-15    Patient Name: Lulu Jiang  Date:2/15/2022  : 2000  [x]  Patient  Verified  Payor: BLUE CROSS MEDICAID / Plan: [facility] [employer] / Product Type: Managed Care Medicaid /    In time: 1:21P  Out time: 2:20P  Total Treatment Time (min): 59  Total Timed Codes (min): 49  1:1 Treatment Time ( only): 40  Visit #:  4    Treatment Area: Right ankle pain [M25.571]    SUBJECTIVE  Pain Level (0-10 scale): 4/10  Any medication changes, allergies to medications, adverse drug reactions, diagnosis change, or new procedure performed?: [x] No    [] Yes (see summary sheet for update)  Subjective functional status/changes:   [] No changes reported  Pt reports feeling better. Called the MDs office they could not see him until next Tuesday. Advised by nurse to take it easy and report to ER if pain got worse. Pt reports he performed the HEP he could tolerate and iced it often.      OBJECTIVE    Modality rationale: decrease pain and increase tissue extensibility to improve the patients ability to walk without pain   Min Type Additional Details       [] Estim: []Att   []Unatt    []TENS instruct                  []IFC  []Premod   []NMES                     []Other:  []w/US   []w/ice   []w/heat  Position:  Location:       []  Traction: [] Cervical       []Lumbar                       [] Prone          []Supine                       []Intermittent   []Continuous Lbs:  [] before manual  [] after manual  []w/heat    []  Ultrasound: []Continuous   [] Pulsed                       at: []1MHz   []3MHz Location:  W/cm2:    [] Paraffin         Location:   []w/heat   10 [x]  Ice     []  Heat  []  Ice massage Position: supine  Location: R ankle    []  Laser  []  Other: Position:  Location:      []  Vasopneumatic Device Pressure:       [] lo [] med [] hi   Temperature:      [x] Skin assessment post-treatment:  [x]intact []redness- no adverse reaction    []redness  adverse reaction: 14 min Therapeutic Exercise:  [x] See flow sheet : passive DF   Rationale: increase ROM, increase strength and improve balance to improve the patients ability to walk without pain    30 min Neuromuscular Reeducation  [x] See flow sheet : use of mirror for seated heel/toe raises, heel slides with towel, desensitization with towel, PROM seated DF   Rationale: increase ROM, increase strength and improve balance to improve the patients ability to walk without pain    5 min Manual Therapy: Gentle scar mobs   Rationale: increase ROM, increase strength and improve balance to improve the patients ability to walk without pain          With   [] TE   [] TA   [] Neuro   [] SC   [] other: Patient Education: [x] Review HEP    [] Progressed/Changed HEP based on:   [] positioning   [] body mechanics   [] transfers   [] heat/ice application    [] other:      Other Objective/Functional Measures: CKC DF 15 deg PROM DF (knee straight) 7 deg     Pain Level (0-10 scale) post treatment: 0/10    ASSESSMENT/Changes in Function:   Pt tolerated session very well. Noted improvement in sensitivity with mirror training today. Able to tolerate PROM and gentle scar mobs today. Instructed pt to use mirror training at home to further contribute to desensitization  Patient will continue to benefit from skilled PT services to modify and progress therapeutic interventions, address functional mobility deficits, address ROM deficits, address strength deficits, analyze and address soft tissue restrictions and analyze and cue movement patterns to attain remaining goals. []  See Plan of Care  []  See progress note/recertification  []  See Discharge Summary         Progress towards goals / Updated goals:    Short Term Goals:  To be accomplished in 4-8 treatments:               1. Pt will be compliant with initial HEP and PT attendance MET               2. Pt will be able to demonstrate 0deg ankle DF with knee extended MET               3. Pt will be able to demonstrate at least 10deg ankle eversion and inversion without pain     Long Term Goals:  To be accomplished in 18-24 treatments:               1. Pt will be able to walk at school between classes without increase in symptoms              2. Pt will be able to begin light jogging without increase in symptoms              3. Pt will demonstrate at least 10deg ankle DF for improved functional mobility              4. Pt will be able to self-manage care using updated HEP for improved independence              5. Pt will be able to perform single limb hopping drills without increase in discomfort              6. Improved FOTO score to 70 or better to demonstrate improved function    PLAN  [x]  Upgrade activities as tolerated     [x]  Continue plan of care  []  Update interventions per flow sheet       []  Discharge due to:_  []  Other:_      Justo Davalos, DANIELA, OPTA, AIB-VR 2/15/2022

## 2022-02-17 ENCOUNTER — HOSPITAL ENCOUNTER (OUTPATIENT)
Dept: PHYSICAL THERAPY | Age: 22
Discharge: HOME | End: 2022-02-17
Payer: MEDICAID

## 2022-02-17 PROCEDURE — 97016 VASOPNEUMATIC DEVICE THERAPY: CPT | Performed by: PHYSICAL THERAPIST

## 2022-02-17 PROCEDURE — 97110 THERAPEUTIC EXERCISES: CPT | Performed by: PHYSICAL THERAPIST

## 2022-02-17 PROCEDURE — 97112 NEUROMUSCULAR REEDUCATION: CPT | Performed by: PHYSICAL THERAPIST

## 2022-02-17 NOTE — PROGRESS NOTES
PT DAILY TREATMENT NOTE - [facility] 2-15    Patient Name: Hortencia Siddiqi  Date:2022  : 2000  [x]  Patient  Verified  Payor: BLUE CROSS MEDICAID / Plan: [address] [place] [address] / Product Type: Managed Care Medicaid /    In time: 116p  Out time: 212p  Total Treatment Time (min): 56  Total Timed Codes (min): 46  1:1 Treatment Time ( [address] only): 41   Visit #:  5    Treatment Area: Right ankle pain [M25.571]    SUBJECTIVE  Pain Level (0-10 scale): 0  Any medication changes, allergies to medications, adverse drug reactions, diagnosis change, or new procedure performed?: [x] No    [] Yes (see summary sheet for update)  Subjective functional status/changes:   [] No changes reported  Doing better today. Pain is feeling significantly better. OBJECTIVE             Modality rationale: decrease pain and increase tissue extensibility to improve the patients ability to walk without pain    Min Type Additional Details        []? Estim: []? Att   []? Unatt    []? TENS instruct                  []?IFC  []? Premod   []? NMES                     []?Other:  []?w/US   []?w/ice   []?w/heat  Position:  Location:        []? Traction: []? Cervical       []? Lumbar                       []? Prone          []? Supine                       []?Intermittent   []? Continuous Lbs:  []? before manual  []? after manual  []?w/heat     []? Ultrasound: []? Continuous   []? Pulsed                       at: []?1MHz   []? 3MHz Location:  W/cm2:     []? Paraffin         Location:   []?w/heat    []? Ice     []? Heat  []? Ice massage Position: supine  Location: R ankle     []? Laser  []? Other: Position:  Location:       10 [x]? Vasopneumatic Device Pressure:       []? lo [x]? med []? hi   Temperature: 34deg      [x]? Skin assessment post-treatment:  [x]? intact []? redness- no adverse reaction    []? redness  adverse reaction:      16 min Therapeutic Exercise:  [x]?  See flow sheet : passive DF   Rationale: increase ROM, increase strength and improve balance to improve the patients ability to walk without pain     30 min Neuromuscular Reeducation  [x]? See flow sheet : use of mirror for seated heel/toe raises, heel slides with towel, desensitization with towel, PROM seated DF   Rationale: increase ROM, increase strength and improve balance to improve the patients ability to walk without pain                                                                 With   []? TE   []? TA   []? Neuro   []? SC   []? other: Patient Education: [x]? Review HEP    []? Progressed/Changed HEP based on:   []? positioning   []? body mechanics   []? transfers   []? heat/ice application    []? other:       Other Objective/Functional Measures: PROM DF (knee straight) 18 deg , PF: 40deg     Inv: 15deg p!, Eversion: -3deg    Pain Level (0-10 scale) post treatment: 0/10     ASSESSMENT/Changes in Function:   Dramatic improvements in the last few sessions with desensitization of surgical site. He is to f/u with Dr. Mirian Crawford next week, and asked him to inquire about transitioning into lace up ankle brace. Patient will continue to benefit from skilled PT services to modify and progress therapeutic interventions, address functional mobility deficits, address ROM deficits, address strength deficits, analyze and address soft tissue restrictions and analyze and cue movement patterns to attain remaining goals. []? See Plan of Care  []? See progress note/recertification  []? See Discharge Summary         Progress towards goals / Updated goals:     Short Term Goals: To be accomplished in 4-8 treatments:               7. Pt will be compliant with initial HEP and PT attendance MET               2. Pt will be able to demonstrate 0deg ankle DF with knee extended MET               3. Pt will be able to demonstrate at least 10deg ankle eversion and inversion without pain PARTIALLY MET     Long Term Goals: To be accomplished in 18-24 treatments:               1.  Pt will be able to walk at school between classes without increase in symptoms              2. Pt will be able to begin light jogging without increase in symptoms              3. Pt will demonstrate at least 10deg ankle DF for improved functional mobility              4. Pt will be able to self-manage care using updated HEP for improved independence              5. Pt will be able to perform single limb hopping drills without increase in discomfort              6. Improved FOTO score to 70 or better to demonstrate improved function     PLAN  [x]? Upgrade activities as tolerated     [x]? Continue plan of care  []? Update interventions per flow sheet       []? Discharge due to:_  []?   Other:_        Chely Moore, PT, DPT 2/17/2022

## 2022-02-18 NOTE — PROGRESS NOTES
Physical Therapy at [facility],   a part of [address] [place] [address]  [address] [address], [address] [place] [address], [address] [place] [address]  Phone: [phone] Fax: [phone]    Progress Note    Name: Sim Giles   : 2000   MD: Blanca Aranda MD       Treatment Diagnosis: Right ankle pain [M25.571]  Start of Care: 22    Visits from Start of Care: 5  Missed Visits: --    Summary of Care: Mr. Sabino Vail has been seen for 5 sessions s/p Bronstrom repair. Initially, he was presenting with pretty severe hypersensitivity around the surgical site that was limiting function. We have been very successful in this regard, and he is now demonstrating significantly reduced sensitivity, which has directly correlated with improved mobility and reduced pain. Current ROM is as follows:    DF (knee straight) 18 deg   PF: 40deg    Rearfoot Inv: 15deg, mild p! Rearfoot Eversion: -3deg    He is ambulating well without limp in CAM walking boot. We have yet to transition him out of boot, but will begin to work him out of it if advised by MD. Otherwise, plan to improve AROM/PROM per tolerance, as well as progress ankle and LE strengthening. Short Term Goals: To be accomplished in 4-8 treatments:               3. Pt will be compliant with initial HEP and PT attendance MET               2. Pt will be able to demonstrate 0deg ankle DF with knee extended MET               3. Pt will be able to demonstrate at least 10deg ankle eversion and inversion without pain PARTIALLY MET     Long Term Goals: To be accomplished in 18-24 treatments:               1.  Pt will be able to walk at school between classes without increase in symptoms              2. Pt will be able to begin light jogging without increase in symptoms              3. Pt will demonstrate at least 10deg ankle DF for improved functional mobility              4. Pt will be able to self-manage care using updated HEP for improved independence              5. Pt will be able to perform single limb hopping drills without increase in discomfort              6. Improved FOTO score to 70 or better to demonstrate improved function    Assessment / Recommendations: Other: Recommend patient continue with PT 2x/weekly at this time. Will emphasize restoration of full AROM and begin to wean out of CAM boot per MD instructions. Please advise on timeline to wean into ankle brace and ongoing post op restrictions.      Tatyana Barger, PT, DPT 2/18/2022

## 2022-02-22 ENCOUNTER — OFFICE VISIT (OUTPATIENT)
Dept: ORTHOPEDIC SURGERY | Age: 22
End: 2022-02-22
Payer: MEDICAID

## 2022-02-22 ENCOUNTER — HOSPITAL ENCOUNTER (OUTPATIENT)
Dept: PHYSICAL THERAPY | Age: 22
Discharge: HOME | End: 2022-02-22
Payer: MEDICAID

## 2022-02-22 VITALS — WEIGHT: 145 LBS | HEIGHT: 66 IN | BODY MASS INDEX: 23.3 KG/M2

## 2022-02-22 DIAGNOSIS — M25.371 ANKLE INSTABILITY, RIGHT: Primary | ICD-10-CM

## 2022-02-22 DIAGNOSIS — M25.471 RIGHT ANKLE SWELLING: ICD-10-CM

## 2022-02-22 PROCEDURE — 97110 THERAPEUTIC EXERCISES: CPT | Performed by: PHYSICAL THERAPIST

## 2022-02-22 PROCEDURE — 99024 POSTOP FOLLOW-UP VISIT: CPT | Performed by: ORTHOPAEDIC SURGERY

## 2022-02-22 RX ORDER — NAPROXEN 500 MG/1
500 TABLET ORAL 2 TIMES DAILY WITH MEALS
Qty: 60 TABLET | Refills: 0 | Status: SHIPPED | OUTPATIENT
Start: 2022-02-22

## 2022-02-22 NOTE — PROGRESS NOTES
Chief Complaint   Patient presents with    Surgical Follow-up     right ankle, fell down the stairs since last visit, has been having swelling since

## 2022-02-22 NOTE — PROGRESS NOTES
Fe Botello (: 2000) is a 22 y.o. male patient, here for evaluation of the following chief complaint(s):  Surgical Follow-up (right ankle, fell down the stairs since last visit, has been having swelling since)       ASSESSMENT/PLAN:  Below is the assessment and plan developed based on review of pertinent history, physical exam, labs, studies, and medications. Postterm right ankle: Some stairs at school pain he was in his brace status post Broström right ankle stairs fell school hurt his ankle comes in with a boot pain we will put him on some Naprosyn get some PT involved he needs to be he needs aggressive rehab his ankle stable back in 3 weeks      1. Ankle instability, right  2. Right ankle swelling  -     XR ANKLE RT MIN 3 V; Future      No follow-ups on file. SUBJECTIVE/OBJECTIVE:  Fe Botello (: 2000) is a 22 y.o. male who presents today for the following:  Chief Complaint   Patient presents with    Surgical Follow-up     right ankle, fell down the stairs since last visit, has been having swelling since       Right ankle discomfort status post Brostrom fell down some stairs he is wearing a lace up ankle brace at the time of the injury pain swelling points to the [place] site as a site of discomfort    IMAGING:  3 views right ankle no fracture no loose body no OCD lesion he subjectively has some osteopenia osteoporosis    Allergies   Allergen Reactions    Nectarine Itching       Current Outpatient Medications   Medication Sig    naproxen (NAPROSYN) 500 mg tablet Take 1 Tablet by mouth two (2) times daily (with meals).  ibuprofen (MOTRIN) 600 mg tablet Take 1 Tab by mouth every six (6) hours as needed for Pain. (Patient not taking: Reported on 11/15/2021)     No current facility-administered medications for this visit. History reviewed. No pertinent past medical history.      Past Surgical History:   Procedure Laterality Date    HX ANKLE FRACTURE TX Right 2021 RASHID       History reviewed. No pertinent family history. Social History     Tobacco Use    Smoking status: Never Smoker    Smokeless tobacco: Never Used   Substance Use Topics    Alcohol use: No        Review of Systems     No flowsheet data found. Vitals:  Ht 5' 6\" (1.676 m)   Wt 145 lb (65.8 kg)   BMI 23.40 kg/m²    Body mass index is 23.4 kg/m². Physical Exam    Pleasant young man well-groomed wounds look good ankle stable to varus and valgus stress he has good subtalar motion he has a negative drawer and plantar flexion in neutral heel cords intact anterior tib intact EHLs intact good capillary refill palpable pulse no effusion palmitate minimal if any swelling      An electronic signature was used to authenticate this note.   -- Kiera Vuong MD

## 2022-02-22 NOTE — PROGRESS NOTES
PT DAILY TREATMENT NOTE - [facility] 2-15    Patient Name: Vargas Maria  Date:2022  : 2000  [x]  Patient  Verified  Payor: BLUE CROSS MEDICAID / Plan: [address] [place] [address] / Product Type: Managed Care Medicaid /    In time: 994C  Out time: 320p  Total Treatment Time (min): 39  Total Timed Codes (min): 39  1:1 Treatment Time ( [address] only): 44   Visit #:  6    Treatment Area: Right ankle pain [M25.571]    SUBJECTIVE  Pain Level (0-10 scale): 0  Any medication changes, allergies to medications, adverse drug reactions, diagnosis change, or new procedure performed?: [x] No    [] Yes (see summary sheet for update)  Subjective functional status/changes:   [] No changes reported  Doing well. Went to the MD today and he said to get out of the boot and to get aggressive with his strengthening. OBJECTIVE    Modality rationale: decrease pain and increase tissue extensibility to improve the patients ability to walk without pain     Min Type Additional Details        []? ? Estim: []??Att   []? ? Unatt    []? ?TENS instruct                  []? ?IFC  []? ?Premod   []? ?NMES                     []? ? Other:  []??w/US   []? ?w/ice   []? ?w/heat  Position:  Location:        []? ?  Traction: []?? Cervical       []? ?Lumbar                       []? ? Prone          []? ?Supine                       []? ?Intermittent   []? ? Continuous Lbs:  []?? before manual  []? ? after manual  []? ?w/heat     []? ?  Ultrasound: []??Continuous   []? ? Pulsed                       TM: []??1MHz   []? ? 3MHz Location:  W/cm2:     []? ? Paraffin         Location:   []??w/heat   To go  [x]? ?  Ice     []? ?  Heat  []? ?  Ice massage Position: supine  Location: R ankle     []? ?  Laser  []? ?  Other: Position:  Location:        []? ?  Vasopneumatic Device Pressure:       []? ? lo [x]? ? med []?? hi   Temperature: 34deg      [x]? ? Skin assessment post-treatment:  [x]? ? intact []? ?redness- no adverse reaction    []? ?redness  adverse reaction:      39 min Therapeutic Exercise:  [x]? ? See flow sheet : passive DF   Rationale: increase ROM, increase strength and improve balance to improve the patients ability to walk without pain                                                                 With   []?? TE   []?? TA   []? ? Neuro   []?? SC   []?? other: Patient Education: [x]? ? Review HEP    []? ? Progressed/Changed HEP based on:   []?? positioning   []? ? body mechanics   []? ? transfers   []? ? heat/ice application    []? ? other:       Other Objective/Functional Measures: --     Pain Level (0-10 scale) post treatment: 0/10    ASSESSMENT/Changes in Function:   Significant improvement again this week. WB SLS without boot without pain. Held on some exercises as he needs to get a lace up brace first. Otherwise will continue to progress next session. Patient will continue to benefit from skilled PT services to modify and progress therapeutic interventions, address functional mobility deficits, address ROM deficits, address strength deficits, analyze and address soft tissue restrictions, analyze and cue movement patterns and analyze and modify body mechanics/ergonomics to attain remaining goals. []  See Plan of Care  []  See progress note/recertification  []  See Discharge Summary         Progress towards goals / Updated goals:    Short Term Goals: To be accomplished in 4-8 treatments:               3. Pt will be compliant with initial HEP and PT attendance MET               2. Pt will be able to demonstrate 0deg ankle DF with knee extended MET               3. Pt will be able to demonstrate at least 10deg ankle eversion and inversion without pain PARTIALLY MET     Long Term Goals: To be accomplished in 18-24 treatments:               1.  Pt will be able to walk at school between classes without increase in symptoms              2. Pt will be able to begin light jogging without increase in symptoms              3. Pt will demonstrate at least 10deg ankle DF for improved functional mobility              4. Pt will be able to self-manage care using updated HEP for improved independence              5.  Pt will be able to perform single limb hopping drills without increase in discomfort              6. Improved FOTO score to 70 or better to demonstrate improved function    PLAN  [x]  Upgrade activities as tolerated     [x]  Continue plan of care  []  Update interventions per flow sheet       []  Discharge due to:_  []  Other:_      Jade Ham, PT, DPT 2/22/2022

## 2022-02-24 ENCOUNTER — HOSPITAL ENCOUNTER (OUTPATIENT)
Dept: PHYSICAL THERAPY | Age: 22
Discharge: HOME | End: 2022-02-24
Payer: MEDICAID

## 2022-02-24 PROCEDURE — 97110 THERAPEUTIC EXERCISES: CPT | Performed by: PHYSICAL THERAPIST

## 2022-02-24 PROCEDURE — 97016 VASOPNEUMATIC DEVICE THERAPY: CPT | Performed by: PHYSICAL THERAPIST

## 2022-02-24 NOTE — PROGRESS NOTES
PT DAILY TREATMENT NOTE - [facility] 2-15    Patient Name: Curly Fay  Date:2022  : 2000  [x]  Patient  Verified  Payor: BLUE [employer]OSS MEDICAID / Plan: [address] [place] [address] / Product Type: Managed Care Medicaid /    In time: 159p  Out time: 253p  Total Treatment Time (min): 54  Total Timed Codes (min): 44  1:1 Treatment Time ( only): 44   Visit #:  7    Treatment Area: Right ankle pain [M25.571]    SUBJECTIVE  Pain Level (0-10 scale): 0  Any medication changes, allergies to medications, adverse drug reactions, diagnosis change, or new procedure performed?: [x] No    [] Yes (see summary sheet for update)  Subjective functional status/changes:   [] No changes reported  Doing well today. Got the ankle brace and has been walking around in it without any issues. OBJECTIVE    Modality rationale: decrease pain and increase tissue extensibility to improve the patients ability to walk without pain     Min Type Additional Details        []??? Estim: []? ? ? Att   []? ??Unatt    []? ??TENS instruct                  []? ??IFC  []? ? ?Premod   []? ??NMES                     []? ??Other:  []???w/US   []? ??w/ice   []? ??w/heat  Position:  Location:        []? ??  Traction: []??? Cervical       []? ? ?Lumbar                       []? ?? Prone          []? ? ?Supine                       []? ? ? Intermittent   []? ?? Continuous Lbs:  []??? before manual  []? ?? after manual  []? ??w/heat     []? ??  Ultrasound: []? ? ? Continuous   []? ?? Pulsed                       at: []???1MHz   []? ??3MHz Location:  W/cm2:     []??? Paraffin         Location:   []???w/heat     []? ??  Ice     []? ??  Heat  []? ??  Ice massage Position: supine  Location: R ankle     []? ??  Laser  []? ??  Other: Position:  Location:   10     [x]? ??  Vasopneumatic Device Pressure:       []? ?? lo [x]? ?? med []? ?? hi   Temperature: 34deg      [x]? ?? Skin assessment post-treatment:  [x]? ??intact []? ??redness- no adverse reaction    []? ??redness  adverse reaction:    39 min Therapeutic Exercise:  [x]? ?? See flow sheet :    Rationale: increase ROM, increase strength and improve balance to improve the patients ability to walk without pain                                                                 With   []??? TE   []??? TA   []??? Neuro   []??? SC   []? ?? other: Patient Education: [x]??? Review HEP    []? ?? Progressed/Changed HEP based on:   []??? positioning   []? ?? body mechanics   []? ?? transfers   []? ?? heat/ice application    []? ?? other:       Other Objective/Functional Measures: --     Pain Level (0-10 scale) post treatment: 0/10     ASSESSMENT/Changes in Function:   Did really well today with total progression of exercises. No pain with all standing exercises today, but notable weakness with single limb squats. Patient will continue to benefit from skilled PT services to modify and progress therapeutic interventions, address functional mobility deficits, address ROM deficits, address strength deficits, analyze and address soft tissue restrictions, analyze and cue movement patterns and analyze and modify body mechanics/ergonomics to attain remaining goals. []? See Plan of Care  []? See progress note/recertification  []? See Discharge Summary         Progress towards goals / Updated goals:     Short Term Goals: To be accomplished in 4-8 treatments:               7. Pt will be compliant with initial HEP and PT attendance MET               2. Pt will be able to demonstrate 0deg ankle DF with knee extended MET               3. Pt will be able to demonstrate at least 10deg ankle eversion and inversion without pain PARTIALLY MET     Long Term Goals: To be accomplished in 18-24 treatments:               1.  Pt will be able to walk at school between classes without increase in symptoms              2. Pt will be able to begin light jogging without increase in symptoms              3. Pt will demonstrate at least 10deg ankle DF for improved functional mobility              4. Pt will be able to self-manage care using updated HEP for improved independence              5. Pt will be able to perform single limb hopping drills without increase in discomfort              6. Improved FOTO score to 70 or better to demonstrate improved function     PLAN  [x]? Upgrade activities as tolerated     [x]? Continue plan of care  []? Update interventions per flow sheet       []? Discharge due to:_  []?   Other:_             Rey Gagnon PT, DPT 2/24/2022

## 2022-03-08 ENCOUNTER — HOSPITAL ENCOUNTER (OUTPATIENT)
Dept: PHYSICAL THERAPY | Age: 22
Discharge: HOME | End: 2022-03-08
Payer: MEDICAID

## 2022-03-08 PROCEDURE — 97016 VASOPNEUMATIC DEVICE THERAPY: CPT | Performed by: PHYSICAL THERAPIST

## 2022-03-08 PROCEDURE — 97140 MANUAL THERAPY 1/> REGIONS: CPT | Performed by: PHYSICAL THERAPIST

## 2022-03-08 PROCEDURE — 97110 THERAPEUTIC EXERCISES: CPT | Performed by: PHYSICAL THERAPIST

## 2022-03-08 NOTE — PROGRESS NOTES
PT DAILY TREATMENT NOTE - [facility] 2-15    Patient Name: Ruy Ramírez  Date:3/8/2022  : 2000  [x]  Patient  Verified  Payor: BLUE CROSS MEDICAID / Plan: [facility] [employer] / Product Type: Managed Care Medicaid /    In time: 156O  Out time: 321p  Total Treatment Time (min): 56  Total Timed Codes (min): 46  1:1 Treatment Time ([address] only): 41   Visit #:  8    Treatment Area: Right ankle pain [M25.571]    SUBJECTIVE  Pain Level (0-10 scale): 0  Any medication changes, allergies to medications, adverse drug reactions, diagnosis change, or new procedure performed?: [x] No    [] Yes (see summary sheet for update)  Subjective functional status/changes:   [] No changes reported  Ankle is doing well today. Upset because he lost his [employer] scholarship for running, so has transferred to [facility]    Modality rationale: decrease pain and increase tissue extensibility to improve the patients ability to walk without pain     Min Type Additional Details        []???? Estim: []?? ?? Att   []????Unatt    []????TENS instruct                  []????IFC  []????Premod   []????NMES                     []?? ??Other:  []????w/US   []? ???w/ice   []? ???w/heat  Position:  Location:        []????  Traction: []???? Cervical       []????Lumbar                       []???? Prone          []????Supine                       []?? ?? Intermittent   []???? Continuous Lbs:  []???? before manual  []???? after manual  []? ???w/heat     []????  Ultrasound: []???? Continuous   []???? Pulsed                       at: []????1MHz   []????3MHz Location:  W/cm2:     []???? Paraffin         Location:   []????w/heat     []????  Ice     []????  Heat  []????  Ice massage Position: supine  Location: R ankle     []????  Laser  []????  Other: Position:  Location:   10     [x]? ???  Vasopneumatic Device Pressure:       []???? lo [x]???? med []???? hi   Temperature: 34deg      [x]? ??? Skin assessment post-treatment:  [x]? ???intact []????redness- no adverse reaction    []? ???redness  adverse reaction:       38 min Therapeutic Exercise:  [x]? ??? See flow sheet :    Rationale: increase ROM, increase strength and improve balance to improve the patients ability to walk without pain                                                                 8 min Manual Therapy: Talocrural DF mobilization GIII-IV    Rationale: decrease pain, increase ROM and increase tissue extensibility to improve the patients ability to run without pain    With   []???? TE   []???? TA   []???? Neuro   []???? SC   []???? other: Patient Education: [x]???? Review HEP    []???? Progressed/Changed HEP based on:   []???? positioning   []???? body mechanics   []???? transfers   []???? heat/ice application    []???? other:       Other Objective/Functional Measures: subtalar DF: 5deg (10deg post-manual)  DF: 18deg (20deg post-manual)     Pain Level (0-10 scale) post treatment: 0/10     ASSESSMENT/Changes in Function:   Continues to show significant improvement at each session. Performing unilateral HR without difficulty or pain. Messaged Dr. Domenica Eisenberg regarding the possibility of having him begin plyos. He is going to f/u with with him next week, and will re-assess at next session  Patient will continue to benefit from skilled PT services to modify and progress therapeutic interventions, address functional mobility deficits, address ROM deficits, address strength deficits, analyze and address soft tissue restrictions, analyze and cue movement patterns and analyze and modify body mechanics/ergonomics to attain remaining goals.     []? ?  See Plan of Care  []? ?  See progress note/recertification  []? ?  See Discharge Summary         Progress towards goals / Updated goals:     Short Term Goals: To be accomplished in 4-8 treatments:               1.  Pt will be compliant with initial HEP and PT attendance MET               2. Pt will be able to demonstrate 0deg ankle DF with knee extended MET               3. Pt will be able to demonstrate at least 10deg ankle eversion and inversion without pain PARTIALLY MET     Long Term Goals: To be accomplished in 18-24 treatments:               1. Pt will be able to walk at school between classes without increase in symptoms MET              2. Pt will be able to begin light jogging without increase in symptoms              3. Pt will demonstrate at least 10deg ankle DF for improved functional mobility MET              4. Pt will be able to self-manage care using updated HEP for improved independence              5. Pt will be able to perform single limb hopping drills without increase in discomfort              6. Improved FOTO score to 70 or better to demonstrate improved function     PLAN  [x]? ?  Upgrade activities as tolerated     [x]? ?  Continue plan of care  []? ?  Update interventions per flow sheet       []? ?  Discharge due to:_  []??  Other:_          Gagan Gambino, PT, DPT 3/8/2022

## 2022-03-10 ENCOUNTER — APPOINTMENT (OUTPATIENT)
Dept: PHYSICAL THERAPY | Age: 22
End: 2022-03-10
Payer: MEDICAID

## 2022-03-15 ENCOUNTER — OFFICE VISIT (OUTPATIENT)
Dept: ORTHOPEDIC SURGERY | Age: 22
End: 2022-03-15
Payer: MEDICAID

## 2022-03-15 VITALS — HEIGHT: 66 IN | WEIGHT: 145 LBS | BODY MASS INDEX: 23.3 KG/M2

## 2022-03-15 DIAGNOSIS — Z98.890 POST-OPERATIVE STATE: Primary | ICD-10-CM

## 2022-03-15 PROCEDURE — 99213 OFFICE O/P EST LOW 20 MIN: CPT | Performed by: ORTHOPAEDIC SURGERY

## 2022-03-15 NOTE — PROGRESS NOTES
Jocelyn Arriaga (: 2000) is a 22 y.o. male patient, here for evaluation of the following chief complaint(s): Ankle Pain (righrt)       ASSESSMENT/PLAN:  Below is the assessment and plan developed based on review of pertinent history, physical exam, labs, studies, and medications. Continues to improve we have a little backsliding with his incident at school with with an inversion injury therapies been working hard I gave him some specific things to start doing in terms of  exercises jumping rope slowly resume a running program he is going to continue to work with therapy and strainer I think he is can do well        No follow-ups on file. SUBJECTIVE/OBJECTIVE:  Jocelyn Arriaga (: 2000) is a 22 y.o. male who presents today for the following:  Chief Complaint   Patient presents with    Ankle Pain     righrt       Doing well continues to make progress reviewed his physical therapy note    IMAGING:  None    Allergies   Allergen Reactions    Nectarine Itching       Current Outpatient Medications   Medication Sig    naproxen (NAPROSYN) 500 mg tablet Take 1 Tablet by mouth two (2) times daily (with meals).  ibuprofen (MOTRIN) 600 mg tablet Take 1 Tab by mouth every six (6) hours as needed for Pain. (Patient not taking: Reported on 11/15/2021)     No current facility-administered medications for this visit. History reviewed. No pertinent past medical history. Past Surgical History:   Procedure Laterality Date    HX ANKLE FRACTURE TX Right 2021    RASHID       History reviewed. No pertinent family history. Social History     Tobacco Use    Smoking status: Never Smoker    Smokeless tobacco: Never Used   Substance Use Topics    Alcohol use: No        Review of Systems  ROS     Positive for: Musculoskeletal    Last edited by Adan Diallo on 3/15/2022  1:25 PM. (History)         No flowsheet data found.        Vitals:  Ht 5' 6\" (1.676 m)   Wt 145 lb (65.8 kg) BMI 23.40 kg/m²    Body mass index is 23.4 kg/m². Physical Exam    Pleasant young man well-groomed minimal sensitivity over the incision ankle stable to varus and valgus stress negative drawer EHL and anterior tib are intact good subtalar motion Achilles is intact negative Homans he can stand on the involved leg and do 10 single foot heel raises      An electronic signature was used to authenticate this note.   -- Tera Baldwin MD

## 2022-03-30 ENCOUNTER — HOSPITAL ENCOUNTER (OUTPATIENT)
Dept: PHYSICAL THERAPY | Age: 22
Discharge: HOME | End: 2022-03-30
Payer: MEDICAID

## 2022-03-30 PROCEDURE — 97110 THERAPEUTIC EXERCISES: CPT | Performed by: PHYSICAL THERAPIST

## 2022-03-30 PROCEDURE — 97530 THERAPEUTIC ACTIVITIES: CPT | Performed by: PHYSICAL THERAPIST

## 2022-03-30 NOTE — PROGRESS NOTES
Physical Therapy at [facility],   a part of [facility]  [address] [address], [address] [place] [address], [address] [place] [address]  Phone: [phone]  Fax: [phone]    Medicaid Discharge Summary  2-15    Patient name: Laura Marie  : 2000  Provider#: [id number]  Referral source: Donis Fung MD      Medical/Treatment Diagnosis: Right ankle pain [M25.571]     Prior Hospitalization: see medical history     Comorbidities: R distal tibial fracture   Prior Level of Function: able to run Div. 1 rack without difficulty  Medications: Verified on Patient Summary List     Start of Care: 22                                                                       Onset Date: 12/3/21         Visits from Start of Care: 9    Missed Visits: 2  Reporting Period : 22 to 3/30/22    ASSESSMENT/SUMMARY OF CARE: Mr. Viridiana Sierra was seen for 9 sessions s/p Bronstrom repair on 12/3/21. He made great progress over the course of the last month or so, and is jogging without issue. His insurance authorization is expiring on 22, so reviewed and updated his HEP today to include more plyometric strengthening and progressions into lateral movements in cutting (in lace up brace only). He should be able to manage his care independently moving forward, and will d/c from care this time. Short Term Goals: To be accomplished in 4-8 treatments:               5. Pt will be compliant with initial HEP and PT attendance MET               2. Pt will be able to demonstrate 0deg ankle DF with knee extended MET               3. Pt will be able to demonstrate at least 10deg ankle eversion and inversion without pain MET     Long Term Goals: To be accomplished in 18-24 treatments:               1.  Pt will be able to walk at school between classes without increase in symptoms MET              2. Pt will be able to begin light jogging without increase in symptoms MET              3. Pt will demonstrate at least 10deg ankle DF for improved functional mobility MET              4. Pt will be able to self-manage care using updated HEP for improved independence MET              5. Pt will be able to perform single limb hopping drills without increase in discomfort MET              6. Improved FOTO score to 70 or better to demonstrate improved function MET    RECOMMENDATIONS:  [x]Discontinue therapy: [x]Patient has reached or is progressing toward set goals/end of insurance authorization      []Patient is non-compliant or has abdicated      []Due to lack of appreciable progress towards set [address] Dee Sands PT, DPT 3/30/2022     ______________________________________________________________________    NOTE TO PHYSICIAN:  Please complete the following and fax to:  Physical Therapy at [facility], a part of [address] [place] [address] Street: Fax: [phone] . Virgen Dickinson Retain this original for your records. If you are unable to process this request in 24 hours, please contact our office.      Physician's Signature:____________________  Date:____________Time:_________           Iliana Mac MD

## 2022-03-30 NOTE — PROGRESS NOTES
PT DAILY TREATMENT NOTE - [facility] 2-15    Patient Name: Kellie Rodas  Date:3/30/2022  : 2000  [x]  Patient  Verified  Payor: BLUE CROSS MEDICAID / Plan: [facility] [employer] HEALTHKEEPERS PLUS / Product Type: Managed Care Medicaid /    In time: 7668Y  Out time: 115  Total Treatment Time (min): 38  Total Timed Codes (min): 38  1:1 Treatment Time ( only): 38   Visit #: 9    Treatment Area: Right ankle pain [M25.571]    SUBJECTIVE  Pain Level (0-10 scale): 0  Any medication changes, allergies to medications, adverse drug reactions, diagnosis change, or new procedure performed?: [x] No    [] Yes (see summary sheet for update)  Subjective functional status/changes:   [] No changes reported  Ankle has been feeling great. He has been doing some jogging on the treadmill and weight lifting without any pain. Forgot his brace today. OBJECTIVE      13 min Therapeutic Exercise:  [x]? ???? See flow sheet :    Rationale: increase ROM, increase strength and improve balance to improve the patients ability to run without pain                                                               25 min Therapeutic Activity:  [x]  See flow sheet : box jumps, agility ladder, progression of hop drills and cutting drills for home   Rationale: increase strength, improve coordination, improve balance and increase proprioception  to improve the patients ability to run without pain     With   []????? TE   []????? TA   []????? Neuro   []????? SC   []????? other: Patient Education: [x]????? Review HEP    []????? Progressed/Changed HEP based on:   []????? positioning   []????? body mechanics   []????? transfers   []????? heat/ice application    []????? other:       Other Objective/Functional Measures: FOTO: 99 (from 3/8/22)     Pain Level (0-10 scale) post treatment: 0/10     ASSESSMENT/Changes in Function:   []???  See Plan of Care  []? ??  See progress note/recertification  [x]? ??  See Discharge Summary         Progress towards goals / Updated goals:     Short Term Goals: To be accomplished in 4-8 treatments:               1. Pt will be compliant with initial HEP and PT attendance MET               2. Pt will be able to demonstrate 0deg ankle DF with knee extended MET               3. Pt will be able to demonstrate at least 10deg ankle eversion and inversion without pain MET     Long Term Goals: To be accomplished in 18-24 treatments:               1. Pt will be able to walk at school between classes without increase in symptoms MET              2. Pt will be able to begin light jogging without increase in symptoms MET              3. Pt will demonstrate at least 10deg ankle DF for improved functional mobility MET              4. Pt will be able to self-manage care using updated HEP for improved independence MET              5. Pt will be able to perform single limb hopping drills without increase in discomfort MET              6. Improved FOTO score to 70 or better to demonstrate improved function MET     PLAN  []???  Upgrade activities as tolerated     []? ??  Continue plan of care  []? ??  Update interventions per flow sheet       [x]? ??  Discharge due to: end of authorization  []? ??  Other:           Jailyn Calixto, PT, DPT 3/30/2022

## 2022-11-30 ENCOUNTER — OFFICE VISIT (OUTPATIENT)
Dept: ORTHOPEDIC SURGERY | Age: 22
End: 2022-11-30
Payer: MEDICAID

## 2022-11-30 VITALS — WEIGHT: 150 LBS | HEIGHT: 66 IN | BODY MASS INDEX: 24.11 KG/M2

## 2022-11-30 DIAGNOSIS — M25.571 CHRONIC PAIN OF RIGHT ANKLE: Primary | ICD-10-CM

## 2022-11-30 DIAGNOSIS — G89.29 CHRONIC PAIN OF RIGHT ANKLE: Primary | ICD-10-CM

## 2022-11-30 PROCEDURE — 99214 OFFICE O/P EST MOD 30 MIN: CPT | Performed by: ORTHOPAEDIC SURGERY

## 2022-11-30 PROCEDURE — L4387 NON-PNEUM WALK BOOT PRE OTS: HCPCS | Performed by: ORTHOPAEDIC SURGERY

## 2022-11-30 NOTE — PROGRESS NOTES
Sylwia Roche (: 2000) is a 25 y.o. male patient, here for evaluation of the following chief complaint(s): Ankle Pain (Right ankle pain. History of ligament reconstruction 2021 )       ASSESSMENT/PLAN:  Below is the assessment and plan developed based on review of pertinent history, physical exam, labs, studies, and medications. Ankle was doing well back in March has been having pain over the last 2 weeks specifically over the distal fibula he has been running more and is changed the location of the venue of his running I suspect he has a stress fracture of the distal fibula with his previous surgeries I think it is wise to get an MRI    We are going to put him in a boot rest him do some basic blood work make sure his vitamin D sed rate CRP are unremarkable  1. Chronic pain of right ankle  -     XR ANKLE RT MIN 3 V; Future  -     REFERRAL TO DME      No follow-ups on file. SUBJECTIVE/OBJECTIVE:  Sylwia Roche (: 2000) is a 25 y.o. male who presents today for the following:  Chief Complaint   Patient presents with    Ankle Pain     Right ankle pain. 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 (Patient not taking: Reported on 2022)    ibuprofen (MOTRIN) 600 mg tablet Take 1 Tab by mouth every six (6) hours as needed for Pain. (Patient not taking: No sig reported)     No current facility-administered medications for this visit.        History reviewed. No pertinent past medical history. Past Surgical History:   Procedure Laterality Date    HX ANKLE FRACTURE TX Right 12/03/2021    RASHID       History reviewed. No pertinent family history. Social History     Tobacco Use    Smoking status: Never    Smokeless tobacco: Never   Substance Use Topics    Alcohol use: No        Review of Systems     No flowsheet data found. Vitals:  Ht 5' 6\" (1.676 m)   Wt 150 lb (68 kg)   BMI 24.21 kg/m²    Body mass index is 24.21 kg/m². Physical Exam    Pleasant young man well-groomed he is very tender over the distal fibula with percussion palpation and pressure he has a positive squeeze sign over the ankle his ankle stable to varus and valgus stress negative drawer EHL and anterior tib are intact heel cords intact good subtalar motion skin looks good palpable pulse      An electronic signature was used to authenticate this note.   -- Kelsey Weston MD

## 2022-12-07 ENCOUNTER — TELEPHONE (OUTPATIENT)
Dept: ORTHOPEDIC SURGERY | Age: 22
End: 2022-12-07

## 2022-12-07 NOTE — TELEPHONE ENCOUNTER
Call to patient [phone]. Advised of recommendations. He will schedule accordingly     ----- Message from Li Luna MD sent at 12/7/2022  7:22 AM EST -----  He should come in to discuss options.    thanks  ----- Message -----  From: Ishaan Boo Results In  Sent: 12/6/2022   8:58 PM EST  To: Li Luna MD

## 2022-12-12 ENCOUNTER — OFFICE VISIT (OUTPATIENT)
Dept: ORTHOPEDIC SURGERY | Age: 22
End: 2022-12-12
Payer: MEDICAID

## 2022-12-12 VITALS — HEIGHT: 66 IN | BODY MASS INDEX: 24.11 KG/M2 | WEIGHT: 150 LBS

## 2022-12-12 DIAGNOSIS — M25.571 CHRONIC PAIN OF RIGHT ANKLE: Primary | ICD-10-CM

## 2022-12-12 DIAGNOSIS — G89.29 CHRONIC PAIN OF RIGHT ANKLE: Primary | ICD-10-CM

## 2022-12-12 PROCEDURE — 99213 OFFICE O/P EST LOW 20 MIN: CPT | Performed by: ORTHOPAEDIC SURGERY

## 2022-12-12 RX ORDER — NAPROXEN 500 MG/1
500 TABLET ORAL 2 TIMES DAILY WITH MEALS
Qty: 60 TABLET | Refills: 1 | Status: SHIPPED | OUTPATIENT
Start: 2022-12-12

## 2022-12-12 NOTE — PROGRESS NOTES
Jefferson Rascon (: 2000) is a 25 y.o. male patient, here for evaluation of the following chief complaint(s):  Follow-up (Right ankle. Here to review MRI and repeat exam)       ASSESSMENT/PLAN:  Below is the assessment and plan developed based on review of pertinent history, physical exam, labs, studies, and medications. His MRI shows changes from the prior surgical procedure Broström repair he also has some peroneal sheath discomfort we can try some scheduled anti-inflammatories and some formal PT if this fails to give him relief I think a scope exam under anesthesia and reconstruction might be considered went over this with him at length 30+ minutes face-to-face time            No follow-ups on file. SUBJECTIVE/OBJECTIVE:  Jefferson Rascon (: 2000) is a 25 y.o. male who presents today for the following:  Chief Complaint   Patient presents with    Follow-up     Right ankle. Here to review MRI and repeat exam       Right ankle pain sudden onset athlete runs track he had a Broström repair with an ankle scope previously    IMAGING:  MRI was reviewed at length    Allergies   Allergen Reactions    Nectarine Itching       Current Outpatient Medications   Medication Sig    naproxen (NAPROSYN) 500 mg tablet Take 1 Tablet by mouth two (2) times daily (with meals). (Patient not taking: No sig reported)    ibuprofen (MOTRIN) 600 mg tablet Take 1 Tab by mouth every six (6) hours as needed for Pain. (Patient not taking: No sig reported)     No current facility-administered medications for this visit. History reviewed. No pertinent past medical history. Past Surgical History:   Procedure Laterality Date    HX ANKLE FRACTURE TX Right 2021    RASHID       History reviewed. No pertinent family history. Social History     Tobacco Use    Smoking status: Never    Smokeless tobacco: Never   Substance Use Topics    Alcohol use: No        Review of Systems     No flowsheet data found. Vitals:  Ht 5' 6\" (1.676 m)   Wt 150 lb (68 kg)   BMI 24.21 kg/m²    Body mass index is 24.21 kg/m². Physical Exam    's ankle stable with a negative drawer both in neutral plantarflexion and slight dorsiflexion his ankle is stable with a negative drawer he has good subtalar motion heel cords intact he is tender over the peroneal sheath he describes pain over the calcaneofibular ligament      An electronic signature was used to authenticate this note.   -- Rexie Leventhal, MD

## 2022-12-21 ENCOUNTER — HOSPITAL ENCOUNTER (OUTPATIENT)
Dept: PHYSICAL THERAPY | Age: 22
Discharge: HOME | End: 2022-12-21
Payer: MEDICAID

## 2022-12-21 PROCEDURE — 97161 PT EVAL LOW COMPLEX 20 MIN: CPT | Performed by: PHYSICAL THERAPIST

## 2022-12-21 NOTE — PROGRESS NOTES
PT INITIAL EVALUATION NOTE - [facility] 2-15    Patient Name: Negro Fritz  Date:2022  : 2000  [x]  Patient  Verified  Payor: BLUE CROSS MEDICAID / Plan: [facility] [employer] HEALTHKEEPERS PLUS / Product Type: Managed Care Medicaid /    In time: 4965Z  Out time: 1155  Total Treatment Time (min): 40  Total Timed Codes (min): 6  1:1 Treatment Time ( only): 6   Visit #: 1     Treatment Area: Bilateral ankle pain [M25.571, M25.572]    SUBJECTIVE  Pain Level (0-10 scale): 3/10  Any medication changes, allergies to medications, adverse drug reactions, diagnosis change, or new procedure performed?: [] No    [x] Yes (see summary sheet for update)  Subjective: While at practice, running 100M  on 2022 and felt pain in the lateral ankle. Pain has progressively worsened since then. Pain is worse with walking > 1 mile, driving > 30 minutes, squats, running, jumping. He has been in a tall walking boot x 1 week. 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 Pt is a college track athlete. OBJECTIVE/EXAMINATION  Posture:  --   Other Observations:  --  Gait and Functional Mobility:  ambulate without crutches and has tall walking boot on right foot. He is able to ambulate with early heel ift off on right without boot on. Unable to perform short foot engagement to actively create medial longitudinal arch  Palpation: tenderness along fibularis longus and brevis tendons    Right ankle AROM 0/4/30      PROM 0/1/30  P!  Limiting DF and PF both AROM and PROM    Knee flexed ankle DF 5  Right 1st MTP Ext 50    Left ankle AROM 16/0/45      PROM 18/0/45  Left 1st MTP Ext 68    Joint Mobility Assessment: unable to assess talocrural A-P and P-a mobility secondary to pain and guarding    Flexibility: gastroc stiffness      LOWER QUARTER   MUSCLE STRENGTH  KEY       R  L  0 - No Contraction  Knee ext  5  5  1 - Trace            flex  5  5  2 - Poor   Hip ext   5  5  3 - Fair flex   5  5  4 - Good         abd  5  5  5 - Normal         add  5  5      Ankle DF  <3 P!  5                PF  3 P!  5                INV  <3 P!  5                EV  <3 P!  5      Neurological: Reflexes / Sensations: normal  Special Tests: unable to assess ligamentous stability due to pain and guarding      6 min Therapeutic Exercise:  [x] See flow sheet : demonstration and preparation of HEP   Rationale: increase ROM, increase strength, improve coordination, improve balance, and increase proprioception to improve the patients ability to ambulate, run, squat and jump          With   [x] TE   [] TA   [] Neuro   [] SC   [] other: Patient Education: [x] Review HEP    [] Progressed/Changed HEP based on:   [] positioning   [] body mechanics   [] transfers   [] heat/ice application    [] other:      Other Objective/Functional Measures: FOTO Functional Measure: 43/100                         Pain Level (0-10 scale) post treatment: 3/10    ASSESSMENT/Changes in Function:     [x]  See Plan of Care      Jose Alfredo Fajardo, PT, DPT 12/21/2022

## 2022-12-21 NOTE — THERAPY EVALUATION
Physical Therapy at [facility],   a part of [facility]  [facility], [address] [place] [address], [address] [place] [address]  Phone: [phone]  Fax: [phone]    Plan of Care/Statement of Necessity for Physical Therapy Services  2-15    Patient name: Verner Shown  : 2000  Provider#: [id number]  Referral source: Mickie Adams MD      Medical/Treatment Diagnosis: Bilateral ankle pain [M25.571, M25.572]     Prior Hospitalization: see medical history     Comorbidities: 2021 Volney Jonh procedure on Right  Prior Level of Function: complete 20 minutes of exercise at least 3 times a week  Medications: Verified on Patient Summary List    Start of Care: 2022     Onset Date: 2021       The Plan of Care and following information is based on the information from the initial evaluation. Assessment/ key information: 25 y.o. male presenting with aggravation of previous Brostrom repair while sprinting and now has posterolateral ankle pain. Evaluation Complexity History MEDIUM  Complexity : 1-2 comorbidities / personal factors will impact the outcome/ POC ; Examination HIGH Complexity : 4+ Standardized tests and measures addressing body structure, function, activity limitation and / or participation in recreation  ;Presentation MEDIUM Complexity : Evolving with changing characteristics  ; Clinical Decision Making MEDIUM Complexity : FOTO score of 26-74  Overall Complexity Rating: MEDIUM    Problem List: pain affecting function, decrease ROM, decrease strength, impaired gait/ balance, decrease ADL/ functional abilitiies, decrease activity tolerance, decrease flexibility/ joint mobility, and decrease transfer abilities   Treatment Plan may include any combination of the following: Therapeutic exercise, Neuromuscular reeducation, Manual therapy, Therapeutic activity, Self care/home management, Vasopneumatic device, and Gait training  Patient / Family readiness to learn indicated by: asking questions and trying to perform skills  Persons(s) to be included in education: patient (P)  Barriers to Learning/Limitations: None  Patient Goal (s): return to running without pain  Patient Self Reported Health Status: good  Rehabilitation Potential: fair    Short Term Goals: To be accomplished in 4-6 treatments:  1) Pt will be independent with HEP. 2) Pt will be able to ambulate on level surface >/1 mile without increase of pain  3) Pt will be able to navigate stairs without pain. Long Term Goals: To be accomplished in 8-12 treatments:  1) Pt will be able to run >/= 1 mile without pain. 2) Pt will be able to navigate uneven terrain without ankle pain or instability. 3) Pt will be able to run, jump and cut without pain. Frequency / Duration: Patient to be seen 2 times per week for 8-12 treatments. Patient/ Caregiver education and instruction: activity modification and exercises    [x]  Plan of care has been reviewed with DANIELA Mann PT, DPT 12/21/2022     ________________________________________________________________________    I certify that the above Therapy Services are being furnished while the patient is under my care. I agree with the treatment plan and certify that this therapy is necessary.     Physician's Signature:____________________  Date:____________Time: _________      Liborio Mclaughlin MD

## 2023-01-03 ENCOUNTER — APPOINTMENT (OUTPATIENT)
Dept: PHYSICAL THERAPY | Age: 23
End: 2023-01-03

## 2023-01-09 ENCOUNTER — OFFICE VISIT (OUTPATIENT)
Dept: ORTHOPEDIC SURGERY | Age: 23
End: 2023-01-09
Payer: MEDICAID

## 2023-01-09 VITALS — WEIGHT: 150 LBS | HEIGHT: 66 IN | BODY MASS INDEX: 24.11 KG/M2

## 2023-01-09 DIAGNOSIS — M25.571 CHRONIC PAIN OF RIGHT ANKLE: Primary | ICD-10-CM

## 2023-01-09 DIAGNOSIS — G89.29 CHRONIC PAIN OF RIGHT ANKLE: Primary | ICD-10-CM

## 2023-01-09 PROCEDURE — 99213 OFFICE O/P EST LOW 20 MIN: CPT | Performed by: ORTHOPAEDIC SURGERY

## 2023-01-09 NOTE — THERAPY DISCHARGE
Physical Therapy at [facility],   a part of [facility]  [facility], [address] [place] [address], [address]  Phone: [phone]  Fax: [phone]    Medicaid Discharge Summary  2-15    Patient name: Tierney Monique  : 2000  Provider#: [id number]  Referral source: Seth Mcnally MD      Medical/Treatment Diagnosis: Bilateral ankle pain [M25.571, M25.572]     Prior Hospitalization: see medical history     Comorbidities: 2021 Arliss Fling procedure on Right  Prior Level of Function: complete 20 minutes of exercise at least 3 times a week  Medications: Verified on Patient Summary List     Start of Care: 2022                                                               Onset Date: 2021                   Visits from Start of Care: 1    Missed Visits: --  Reporting Period : 2022 to 2022    Short Term Goals: To be accomplished in 4-6 treatments:  1) Pt will be independent with HEP. NOT MET  2) Pt will be able to ambulate on level surface >/1 mile without increase of pain NOT MET  3) Pt will be able to navigate stairs without pain. NOT MET    Long Term Goals: To be accomplished in 8-12 treatments:   1) Pt will be able to run >/= 1 mile without pain. NOT MET  2) Pt will be able to navigate uneven terrain without ankle pain or instability. NOT MET  3) Pt will be able to run, jump and cut without pain. NOT MET      ASSESSMENT/SUMMARY OF CARE: Pt failed to return to any scheduled appointments.     RECOMMENDATIONS:  [x]Discontinue therapy: []Patient has reached or is progressing toward set goals      [x]Patient is non-compliant or has abdicated      []Due to lack of appreciable progress towards set goals    Gracia Valencia, PT, DPT 2023     ______________________________________________________________________    NOTE TO PHYSICIAN:  Please complete the following and fax to:  Physical Therapy at [facility], a part of [facility]: Fax: [phone] . Marv Koch Retain this original for your records. If you are unable to process this request in 24 hours, please contact our office.      Physician's Signature:____________________  Date:____________Time:_________           Toni Kruse MD

## 2023-01-09 NOTE — PROGRESS NOTES
Lyn Kauffman (: 2000) is a 25 y.o. male patient, here for evaluation of the following chief complaint(s): Ankle Pain       ASSESSMENT/PLAN:  Below is the assessment and plan developed based on review of pertinent history, physical exam, labs, studies, and medications. 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 SUBJECTIVE/OBJECTIVE:  Lyn Kauffman (: 2000) is a 25 y.o. 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 ibuprofen (MOTRIN) 600 mg tablet Take 1 Tab by mouth every six (6) hours as needed for Pain. No current facility-administered medications for this visit. History reviewed. No pertinent past medical history. Past Surgical History:   Procedure Laterality Date    HX ANKLE FRACTURE TX Right 2021    BROSTROM       History reviewed. No pertinent family history. Social History     Tobacco Use    Smoking status: Never    Smokeless tobacco: Never   Substance Use Topics    Alcohol use:  No Review of Systems     No flowsheet data found. Vitals:  Ht 5' 6\" (1.676 m)   Wt 150 lb (68 kg)   BMI 24.21 kg/m²    Body mass index is 24.21 kg/m². Physical Exam    He is tender over the peroneal sheath ankle stable to varus and valgus stress negative drawer both in neutral plantarflexion and slight dorsiflexion he has good subtalar motion EHL and anterior tib are intact negative Homans Achilles is intact skin looks good palpable pulse      An electronic signature was used to authenticate this note.   -- Juma Doan MD

## (undated) DEVICE — 4-PORT MANIFOLD: Brand: NEPTUNE 2

## (undated) DEVICE — PREP SKN CHLRAPRP APL 26ML STR --

## (undated) DEVICE — DYONICS 25 INFLOW TUBE SET, 3 PER BOX

## (undated) DEVICE — NEEDLE HYPO 25GA L1.5IN BVL ORIENTED ECLIPSE

## (undated) DEVICE — PADDING CST 4INX4YD --

## (undated) DEVICE — SOLUTION IRRIG 3000ML LAC RINGERS ARTHROMTC PLAS CONT

## (undated) DEVICE — ARTHROSCOPY - RICHMOND: Brand: MEDLINE INDUSTRIES, INC.

## (undated) DEVICE — GLOVE ORTHO 8   MSG9480

## (undated) DEVICE — MARKER SKIN GENTIAN VLT FN TIP W/ 6IN RUL L RESVR MED GRD

## (undated) DEVICE — BNDG ELAS HK LOOP 4X5YD NS -- MATRIX

## (undated) DEVICE — SUTURE MCRYL SZ 4 0 L18IN ABSRB UD PC 5 L19MM 3 8 CIR SGL Y823G

## (undated) DEVICE — GOWN,SIRUS,NONRNF,SETINSLV,2XL,18/CS: Brand: MEDLINE

## (undated) DEVICE — GRADUATED BOWL: Brand: DEVON

## (undated) DEVICE — STRIP,CLOSURE,WOUND,MEDI-STRIP,1/2X4: Brand: MEDLINE

## (undated) DEVICE — GLOVE SURG SZ 8 CRM LTX FREE POLYISOPRENE POLYMER BEAD ANTI

## (undated) DEVICE — DYONICS 25 INFLOW/OUTFLOW TUBE                                    SET, SINGLE SUCTION, 3 PER BOX

## (undated) DEVICE — TOWEL,OR,DSP,ST,BLUE,STD,4/PK,20PK/CS: Brand: MEDLINE

## (undated) DEVICE — DRESSING,GAUZE,XEROFORM,CURAD,1"X8",ST: Brand: CURAD

## (undated) DEVICE — PADDING CAST 4 INX5 YD STRL